# Patient Record
Sex: FEMALE | Race: WHITE | Employment: OTHER | URBAN - METROPOLITAN AREA
[De-identification: names, ages, dates, MRNs, and addresses within clinical notes are randomized per-mention and may not be internally consistent; named-entity substitution may affect disease eponyms.]

---

## 2014-08-29 LAB — AMB DEXA, EXTERNAL: NORMAL

## 2019-10-04 LAB
MAMMOGRAPHY, EXTERNAL: NEGATIVE
PAP SMEAR, EXTERNAL: NEGATIVE

## 2020-07-15 VITALS
OXYGEN SATURATION: 99 % | HEIGHT: 63 IN | WEIGHT: 123 LBS | TEMPERATURE: 97.6 F | RESPIRATION RATE: 12 BRPM | DIASTOLIC BLOOD PRESSURE: 82 MMHG | SYSTOLIC BLOOD PRESSURE: 150 MMHG | BODY MASS INDEX: 21.79 KG/M2 | HEART RATE: 80 BPM

## 2020-07-15 PROBLEM — E78.00 HYPERCHOLESTEROLEMIA: Status: ACTIVE | Noted: 2020-07-15

## 2020-07-15 PROBLEM — J30.9 ALLERGIC RHINITIS: Status: ACTIVE | Noted: 2020-07-15

## 2020-07-15 PROBLEM — K21.9 GASTROESOPHAGEAL REFLUX DISEASE: Status: ACTIVE | Noted: 2020-07-15

## 2020-07-15 PROBLEM — E03.9 ACQUIRED HYPOTHYROIDISM: Status: ACTIVE | Noted: 2020-07-15

## 2020-07-15 PROBLEM — G47.00 INSOMNIA: Status: ACTIVE | Noted: 2020-07-15

## 2020-07-15 PROBLEM — F41.9 CHRONIC ANXIETY: Status: ACTIVE | Noted: 2020-07-15

## 2020-07-15 RX ORDER — ASPIRIN 81 MG/1
TABLET ORAL DAILY
COMMUNITY

## 2020-07-15 RX ORDER — ASCORBIC ACID 250 MG
TABLET ORAL
COMMUNITY

## 2020-07-15 RX ORDER — ESZOPICLONE 2 MG/1
TABLET, FILM COATED ORAL
COMMUNITY
End: 2021-03-22

## 2020-07-15 RX ORDER — NITROFURANTOIN MACROCRYSTALS 50 MG/1
50 CAPSULE ORAL
COMMUNITY
End: 2021-03-22

## 2020-07-15 RX ORDER — PRAVASTATIN SODIUM 40 MG/1
40 TABLET ORAL
COMMUNITY
End: 2020-09-10

## 2020-07-15 RX ORDER — LEVOTHYROXINE SODIUM 50 UG/1
TABLET ORAL
COMMUNITY
End: 2020-09-10

## 2020-07-15 RX ORDER — BUPROPION HYDROCHLORIDE 150 MG/1
150 TABLET, EXTENDED RELEASE ORAL 2 TIMES DAILY
COMMUNITY
End: 2020-11-11

## 2020-07-15 RX ORDER — TRAZODONE HYDROCHLORIDE 100 MG/1
100 TABLET ORAL
COMMUNITY
End: 2021-03-22

## 2020-07-15 RX ORDER — DILTIAZEM HYDROCHLORIDE 120 MG/1
120 CAPSULE, EXTENDED RELEASE ORAL DAILY
COMMUNITY
End: 2021-03-29 | Stop reason: SDUPTHER

## 2020-07-15 RX ORDER — ALPRAZOLAM 0.5 MG/1
TABLET ORAL
COMMUNITY
End: 2020-08-15

## 2020-07-15 RX ORDER — CYCLOBENZAPRINE HCL 10 MG
TABLET ORAL
COMMUNITY
End: 2022-04-14

## 2020-08-12 DIAGNOSIS — F41.9 CHRONIC ANXIETY: Primary | ICD-10-CM

## 2020-08-13 VITALS
DIASTOLIC BLOOD PRESSURE: 82 MMHG | SYSTOLIC BLOOD PRESSURE: 150 MMHG | BODY MASS INDEX: 21.79 KG/M2 | HEIGHT: 63 IN | WEIGHT: 123 LBS

## 2020-08-14 ENCOUNTER — TELEPHONE (OUTPATIENT)
Dept: FAMILY MEDICINE CLINIC | Age: 65
End: 2020-08-14

## 2020-08-14 DIAGNOSIS — E03.9 ACQUIRED HYPOTHYROIDISM: Primary | ICD-10-CM

## 2020-08-14 DIAGNOSIS — E55.9 VITAMIN D DEFICIENCY: ICD-10-CM

## 2020-08-14 DIAGNOSIS — E78.00 HYPERCHOLESTEROLEMIA: ICD-10-CM

## 2020-08-14 NOTE — TELEPHONE ENCOUNTER
Patient rescheduled her appt to 9/9/2020 but its a afternoon so she would like to come the week before to get her labs drawn if possible. Also she is out of her Xanax and needs a refill.

## 2020-08-15 RX ORDER — ALPRAZOLAM 0.5 MG/1
TABLET ORAL
Qty: 60 TAB | Refills: 0 | Status: SHIPPED | OUTPATIENT
Start: 2020-08-15 | End: 2020-09-16

## 2020-08-15 NOTE — TELEPHONE ENCOUNTER
I will refill her medication and put in her lab orders. Last refilled [phone] and she has an upcoming appointment

## 2020-08-17 ENCOUNTER — TELEPHONE (OUTPATIENT)
Dept: FAMILY MEDICINE CLINIC | Age: 65
End: 2020-08-17

## 2020-08-17 NOTE — TELEPHONE ENCOUNTER
Left message for patient to contact pharmacy to check on refill. Looks like it was filled 2 days ago.

## 2020-09-10 RX ORDER — PRAVASTATIN SODIUM 40 MG/1
TABLET ORAL
Qty: 90 TAB | Refills: 1 | Status: SHIPPED | OUTPATIENT
Start: 2020-09-10 | End: 2021-03-01

## 2020-09-10 RX ORDER — LEVOTHYROXINE SODIUM 50 UG/1
TABLET ORAL
Qty: 90 TAB | Refills: 1 | Status: SHIPPED | OUTPATIENT
Start: 2020-09-10 | End: 2021-03-01

## 2020-09-10 NOTE — TELEPHONE ENCOUNTER
Requested meds not listed in mediation profile in Epic but they are listed in [place].   Refilling as requested

## 2020-09-15 ENCOUNTER — TELEPHONE (OUTPATIENT)
Dept: FAMILY MEDICINE CLINIC | Age: 65
End: 2020-09-15

## 2020-09-15 NOTE — TELEPHONE ENCOUNTER
It looks like you have the request from the pharmacy and the patient has an appt scheduled with you on 9/21

## 2020-09-21 ENCOUNTER — OFFICE VISIT (OUTPATIENT)
Dept: FAMILY MEDICINE CLINIC | Age: 65
End: 2020-09-21
Payer: COMMERCIAL

## 2020-09-21 VITALS
HEART RATE: 78 BPM | BODY MASS INDEX: 22.45 KG/M2 | DIASTOLIC BLOOD PRESSURE: 72 MMHG | WEIGHT: 122 LBS | OXYGEN SATURATION: 99 % | SYSTOLIC BLOOD PRESSURE: 120 MMHG | HEIGHT: 62 IN | TEMPERATURE: 97.5 F

## 2020-09-21 DIAGNOSIS — Z00.00 ENCOUNTER FOR WELLNESS EXAMINATION IN ADULT: ICD-10-CM

## 2020-09-21 DIAGNOSIS — E78.00 HYPERCHOLESTEROLEMIA: Primary | ICD-10-CM

## 2020-09-21 DIAGNOSIS — F41.9 CHRONIC ANXIETY: ICD-10-CM

## 2020-09-21 PROCEDURE — 99396 PREV VISIT EST AGE 40-64: CPT | Performed by: NURSE PRACTITIONER

## 2020-09-21 NOTE — PROGRESS NOTES
Subjective  Chief Complaint   Patient presents with    Annual Wellness Visit     HPI:  Mary Rose is a 60 y.o. female. She presents for her annual physical and fasting labs. Patient states that her biggest issue is her anxiety which exacerbated with the Tenriism of Covid 1p. 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 She is anxious to get off of her anxiety meds and was without it over a weekend and she states that she could hardly get out of bed. We will continue to find a time to wean and discontinue this medication. There are no inconstencies in her  although she is not in need of a refill at this time. Her last dexa scan was in 2010 and she declines updating it secondary to Covid 19. She also us declining her colonoscopy. The record states that she had it in 2016 but she states that it is not true. She is inclined to do cologuard and I told her to call her insurance company and see if they will cover it. She also declines the pneumonia vaccines and the shingles vaccine but she does get the flu vaccine when it is offered in the fall. She will need to make an appointment as she usually has her eyes checked in June of every year but has not this year but will call for an appointment. She is doing Yoga and is also working on anger management which she feels is because of her anxiety. She reports that she has a neuroma in her right foot and has a history of surgical removal of a neuroma in her left foot. We discussed wearing well fitting shoes and inserts if necessary.       Past Medical History:   Diagnosis Date    Acquired hypothyroidism 7/15/2020    Allergic rhinitis 7/15/2020    Chronic anxiety 7/15/2020    Gastroesophageal reflux disease 7/15/2020    Hypercholesterolemia 7/15/2020    Insomnia 7/15/2020     Family History   Problem Relation Age of Onset    Heart Disease Mother     Other Mother         History of TIA    Ulcerative Colitis Mother     Heart Disease Father     Ulcerative Colitis Sister     Diabetes Brother      Social History     Socioeconomic History    Marital status:      Spouse name: Not on file    Number of children: Not on file    Years of education: Not on file    Highest education level: Not on file   Occupational History    Not on file   Social Needs    Financial resource strain: Not on file    Food insecurity     Worry: Not on file     Inability: Not on file   [employer] needs     Medical: Not on file     Non-medical: Not on file   Tobacco Use    Smoking status: Former Smoker     Last attempt to quit:      Years since quittin.7    Smokeless tobacco: Never Used   Substance and Sexual Activity    Alcohol use: Yes     Frequency: 2-3 times a week     Drinks per session: 1 or 2    Drug use: Never    Sexual activity: Yes   Lifestyle    Physical activity     Days per week: Not on file     Minutes per session: Not on file    Stress: Not on file   Relationships    Social connections     Talks on phone: Not on file     Gets together: Not on file     Attends [employer] service: Not on file     Active member of club or organization: Not on file     Attends meetings of clubs or organizations: Not on file     Relationship status: Not on file    Intimate partner violence     Fear of current or ex partner: Not on file     Emotionally abused: Not on file     Physically abused: Not on file     Forced sexual activity: Not on file   Other Topics Concern    Not on file   Social History Narrative    Not on file     Current Outpatient Medications on File Prior to Visit   Medication Sig Dispense Refill    BIOTIN PO Take 5,000 mcg by mouth. 1 capsule PO daily      cranberry fruit extract (CRANBERRY PO) Take  by mouth.  1 capsule PO daily      ALPRAZolam (XANAX) 0.5 mg tablet TAKE 2 TABLETS BY MOUTH EVERY DAY AS NEEDED 60 Tab 0    pravastatin (PRAVACHOL) 40 mg tablet TAKE 1 TABLET BY MOUTH EVERY NIGHT AT BEDTIME 90 Tab 1    levothyroxine (SYNTHROID) 50 mcg tablet TAKE 1 TABLET BY MOUTH EVERY DAY ON EMPTY STOMACH 90 Tab 1    aspirin delayed-release (Adult Low Dose Aspirin) 81 mg tablet Take  by mouth daily.  buPROPion SR (WELLBUTRIN SR) 150 mg SR tablet Take 150 mg by mouth two (2) times a day.  dilTIAZem ER (DILACOR XR) 120 mg capsule Take 120 mg by mouth daily. 1/2 tablet PO twice daily      nitrofurantoin (MACRODANTIN) 50 mg capsule Take 50 mg by mouth BID Mon Wed & Fri.      conjugated estrogens (Premarin) 1.25 mg tablet Take 1.25 mg by mouth daily.  ascorbic acid, vitamin C, (Vitamin C) 250 mg tablet Take  by mouth.  hydrOXYzine HCL (ATARAX) 50 mg tablet TK 1 T PO QID PRN      sertraline (ZOLOFT) 25 mg tablet 25 mg. Take one by mouth in the morning      OTHER Cannabidiol (CBD) oral edible Take 2 edibles every day by oral route      cyclobenzaprine (FLEXERIL) 10 mg tablet Take  by mouth three (3) times daily as needed for Muscle Spasm(s). Take 1 tablet by mouth twice a day      eszopiclone (LUNESTA) 2 mg tablet Take  by mouth nightly.  traZODone (DESYREL) 100 mg tablet Take 100 mg by mouth nightly. No current facility-administered medications on file prior to visit. Allergies   Allergen Reactions    Sertraline Hives    Augmentin [Amoxicillin-Pot Clavulanate] Diarrhea    Biaxin [Clarithromycin] Other (comments)     Stomach burns    Ceftin [Cefuroxime Axetil] Other (comments)     Stomach burns    Codeine Itching    Erythromycin Base Nausea and Vomiting    Prednisone Other (comments)     confusion    Trazodone Itching     ROS   ROS as per HPI and PMH      Objective  Physical Exam  Vitals signs reviewed. Neck:      Musculoskeletal: Normal range of motion. Cardiovascular:      Rate and Rhythm: Normal rate and regular rhythm. Heart sounds: Normal heart sounds.    Pulmonary:      Effort: Pulmonary effort is normal.      Breath sounds: Normal breath sounds. Abdominal:      General: Bowel sounds are normal.      Palpations: Abdomen is soft. Musculoskeletal: Normal range of motion. Skin:     General: Skin is warm and dry. Neurological:      Mental Status: She is alert and oriented to person, place, and time. Psychiatric:         Mood and Affect: Mood normal.         Behavior: Behavior normal.         Thought Content: Thought content normal.         Judgment: Judgment normal.          Assessment & Plan      ICD-10-CM ICD-9-CM    1. Hypercholesterolemia  E78.00 272.0 LIPID PANEL   2. Encounter for wellness examination in adult  Z00.00 V70.0 CBC WITH AUTOMATED DIFF      METABOLIC PANEL, COMPREHENSIVE   3. Chronic anxiety  F41.9 300.00      Diagnoses and all orders for this visit:    1. Hypercholesterolemia  -     LIPID PANEL    2. Encounter for wellness examination in adult  -     CBC WITH AUTOMATED DIFF  -     METABOLIC PANEL, COMPREHENSIVE    3.  Chronic anxiety        Dory Farfan NP

## 2020-09-22 LAB
ALBUMIN SERPL-MCNC: 4.5 G/DL (ref 3.8–4.8)
ALBUMIN/GLOB SERPL: 2 {RATIO} (ref 1.2–2.2)
ALP SERPL-CCNC: 70 IU/L (ref 39–117)
ALT SERPL-CCNC: 14 IU/L (ref 0–32)
AST SERPL-CCNC: 19 IU/L (ref 0–40)
BASOPHILS # BLD AUTO: 0.1 X10E3/UL (ref 0–0.2)
BASOPHILS NFR BLD AUTO: 1 %
BILIRUB SERPL-MCNC: 0.3 MG/DL (ref 0–1.2)
BUN SERPL-MCNC: 10 MG/DL (ref 8–27)
BUN/CREAT SERPL: 17 (ref 12–28)
CALCIUM SERPL-MCNC: 9.2 MG/DL (ref 8.7–10.3)
CHLORIDE SERPL-SCNC: 101 MMOL/L (ref 96–106)
CHOLEST SERPL-MCNC: 245 MG/DL (ref 100–199)
CO2 SERPL-SCNC: 23 MMOL/L (ref 20–29)
CREAT SERPL-MCNC: 0.59 MG/DL (ref 0.57–1)
EOSINOPHIL # BLD AUTO: 0.1 X10E3/UL (ref 0–0.4)
EOSINOPHIL NFR BLD AUTO: 2 %
ERYTHROCYTE [DISTWIDTH] IN BLOOD BY AUTOMATED COUNT: 11.9 % (ref 11.7–15.4)
GLOBULIN SER CALC-MCNC: 2.2 G/DL (ref 1.5–4.5)
GLUCOSE SERPL-MCNC: 83 MG/DL (ref 65–99)
HCT VFR BLD AUTO: 41.2 % (ref 34–46.6)
HDLC SERPL-MCNC: 85 MG/DL
HGB BLD-MCNC: 13.7 G/DL (ref 11.1–15.9)
IMM GRANULOCYTES # BLD AUTO: 0 X10E3/UL (ref 0–0.1)
IMM GRANULOCYTES NFR BLD AUTO: 0 %
LDLC SERPL CALC-MCNC: 139 MG/DL (ref 0–99)
LYMPHOCYTES # BLD AUTO: 1.6 X10E3/UL (ref 0.7–3.1)
LYMPHOCYTES NFR BLD AUTO: 27 %
MCH RBC QN AUTO: 31.3 PG (ref 26.6–33)
MCHC RBC AUTO-ENTMCNC: 33.3 G/DL (ref 31.5–35.7)
MCV RBC AUTO: 94 FL (ref 79–97)
MONOCYTES # BLD AUTO: 0.4 X10E3/UL (ref 0.1–0.9)
MONOCYTES NFR BLD AUTO: 7 %
NEUTROPHILS # BLD AUTO: 3.8 X10E3/UL (ref 1.4–7)
NEUTROPHILS NFR BLD AUTO: 63 %
PLATELET # BLD AUTO: 254 X10E3/UL (ref 150–450)
POTASSIUM SERPL-SCNC: 4.3 MMOL/L (ref 3.5–5.2)
PROT SERPL-MCNC: 6.7 G/DL (ref 6–8.5)
RBC # BLD AUTO: 4.38 X10E6/UL (ref 3.77–5.28)
SODIUM SERPL-SCNC: 140 MMOL/L (ref 134–144)
TRIGL SERPL-MCNC: 122 MG/DL (ref 0–149)
VLDLC SERPL CALC-MCNC: 21 MG/DL (ref 5–40)
WBC # BLD AUTO: 6 X10E3/UL (ref 3.4–10.8)

## 2020-09-30 ENCOUNTER — TELEPHONE (OUTPATIENT)
Dept: FAMILY MEDICINE CLINIC | Age: 65
End: 2020-09-30

## 2020-09-30 DIAGNOSIS — Z12.11 COLON CANCER SCREENING: Primary | ICD-10-CM

## 2020-10-02 NOTE — PROGRESS NOTES
Your total cholesterol and bad cholesterol are elevated. Your total cholesterol was 235 at your last set of labs and it is now 245 and your bad cholesterol has also slightly increased from 136 to 139. I do see that you are on 40 mgs of atorvastatin and before I make any decisions I want to make sure that you continue to take this medication and preferably in the PM.  If so I will recheck at your next office visit and if it continues to increase I will increase the dosage but I also recommend lifestyle changes such as :Decrease your intake of red meat and increase your intake of fatty fish such as salmon, mackerel and sardines. Increase fiber in your diet.   Decrease your intake of full fat dairy products such as milk and yogurt and try to incorporate at least 30 minutes of exercise most days of the week and losing weight helps everything

## 2020-10-07 NOTE — PROGRESS NOTES
Patient advised. * Patient would like for you to order a cologard for her, as her insurance will pay for this for her.

## 2020-10-13 DIAGNOSIS — F41.9 CHRONIC ANXIETY: ICD-10-CM

## 2020-10-14 RX ORDER — ALPRAZOLAM 0.5 MG/1
TABLET ORAL
Qty: 60 TAB | Refills: 0 | Status: SHIPPED | OUTPATIENT
Start: 2020-10-14 | End: 2020-11-04

## 2020-10-17 VITALS
WEIGHT: 120 LBS | BODY MASS INDEX: 22.08 KG/M2 | SYSTOLIC BLOOD PRESSURE: 110 MMHG | DIASTOLIC BLOOD PRESSURE: 70 MMHG | HEIGHT: 62 IN

## 2020-10-20 ENCOUNTER — OFFICE VISIT (OUTPATIENT)
Dept: OBGYN CLINIC | Age: 65
End: 2020-10-20
Payer: COMMERCIAL

## 2020-10-20 VITALS
SYSTOLIC BLOOD PRESSURE: 120 MMHG | HEIGHT: 62 IN | WEIGHT: 120.13 LBS | BODY MASS INDEX: 22.11 KG/M2 | DIASTOLIC BLOOD PRESSURE: 72 MMHG

## 2020-10-20 DIAGNOSIS — Z12.31 VISIT FOR SCREENING MAMMOGRAM: Primary | ICD-10-CM

## 2020-10-20 DIAGNOSIS — Z01.419 ROUTINE GYNECOLOGICAL EXAMINATION: ICD-10-CM

## 2020-10-20 DIAGNOSIS — Z90.711 SCREENING FOR MALIGNANT NEOPLASM OF VAGINA AFTER PARTIAL HYSTERECTOMY: Primary | ICD-10-CM

## 2020-10-20 DIAGNOSIS — Z12.72 SCREENING FOR MALIGNANT NEOPLASM OF VAGINA AFTER PARTIAL HYSTERECTOMY: Primary | ICD-10-CM

## 2020-10-20 DIAGNOSIS — N95.1 MENOPAUSAL SYNDROME: ICD-10-CM

## 2020-10-20 PROCEDURE — 77063 BREAST TOMOSYNTHESIS BI: CPT | Performed by: OBSTETRICS & GYNECOLOGY

## 2020-10-20 PROCEDURE — 99396 PREV VISIT EST AGE 40-64: CPT | Performed by: OBSTETRICS & GYNECOLOGY

## 2020-10-20 PROCEDURE — 77067 SCR MAMMO BI INCL CAD: CPT | Performed by: OBSTETRICS & GYNECOLOGY

## 2020-10-20 NOTE — PROGRESS NOTES
Michael Gregory is a [de-identified] , 60 y.o. female   No LMP recorded. Patient has had a hysterectomy. She presents for her annual    She is having no significant problems. Menstrual status:  Her periods are: Hysterectomy. Cycles are: Hysterectomy. She does not have dysmenorrhea. Medical conditions:  Since her last annual GYN exam about one year ago, she has not the following changes in her health history: none. Past Medical History:   Diagnosis Date    Acquired hypothyroidism 7/15/2020    Allergic rhinitis 7/15/2020    Chronic anxiety 7/15/2020    Condyloma     Depression     Female stress incontinence     Gastroesophageal reflux disease 7/15/2020    Hypercholesterolemia 7/15/2020    Hypertension     Insomnia 7/15/2020    Menopause     Relaxation of pelvis     Relaxation of pelvic floor.  Small bowel obstruction [facility])      Past Surgical History:   Procedure Laterality Date    HX COLONOSCOPY  2016    HX GI      Repair of small intestine.  HX GYN      Laser Condyloma    HX HERNIA REPAIR      HX HYSTERECTOMY      HX PELVIC LAPAROSCOPY      JOAQUIN    HX RECTOCELE REPAIR      Repair of enterocele    HX SALPINGO-OOPHORECTOMY Right     HX TONSILLECTOMY      SLING OPER STRES INCONTINENCE         Prior to Admission medications    Medication Sig Start Date End Date Taking? Authorizing Provider   ALPRAZolam Susan Section) 0.5 mg tablet TAKE 2 TABLETS BY MOUTH EVERY DAY AS NEEDED 10/14/20  Yes Alie Hidalgo NP   BIOTIN PO Take 5,000 mcg by mouth. 1 capsule PO daily   Yes Provider, Historical   cranberry fruit extract (CRANBERRY PO) Take  by mouth.  1 capsule PO daily   Yes Provider, Historical   pravastatin (PRAVACHOL) 40 mg tablet TAKE 1 TABLET BY MOUTH EVERY NIGHT AT BEDTIME 9/10/20  Yes Alie Hidalgo NP   levothyroxine (SYNTHROID) 50 mcg tablet TAKE 1 TABLET BY MOUTH EVERY DAY ON EMPTY STOMACH 9/10/20  Yes Alie Hidalgo NP   hydrOXYzine HCL (ATARAX) 50 mg tablet TK 1 T PO QID PRN 5/7/20  Yes Provider, Historical   sertraline (ZOLOFT) 25 mg tablet 25 mg. Take one by mouth in the morning 5/8/20  Yes Provider, Historical   aspirin delayed-release (Adult Low Dose Aspirin) 81 mg tablet Take  by mouth daily. Yes Provider, Historical   buPROPion SR (WELLBUTRIN SR) 150 mg SR tablet Take 150 mg by mouth two (2) times a day. Yes Provider, Historical   OTHER Cannabidiol (CBD) oral edible Take 2 edibles every day by oral route   Yes Provider, Historical   cyclobenzaprine (FLEXERIL) 10 mg tablet Take  by mouth three (3) times daily as needed for Muscle Spasm(s). Take 1 tablet by mouth twice a day   Yes Provider, Historical   dilTIAZem ER (DILACOR XR) 120 mg capsule Take 120 mg by mouth daily. 1/2 tablet PO twice daily   Yes Provider, Historical   eszopiclone (LUNESTA) 2 mg tablet Take  by mouth nightly. Yes Provider, Historical   conjugated estrogens (Premarin) 1.25 mg tablet Take 1.25 mg by mouth daily. Yes Provider, Historical   traZODone (DESYREL) 100 mg tablet Take 100 mg by mouth nightly. Yes Provider, Historical   ascorbic acid, vitamin C, (Vitamin C) 250 mg tablet Take  by mouth. Yes Provider, Historical   nitrofurantoin (MACRODANTIN) 50 mg capsule Take 50 mg by mouth BID Mon Wed & Fri. Provider, Historical       Allergies   Allergen Reactions    Augmentin [Amoxicillin-Pot Clavulanate] Diarrhea    Biaxin [Clarithromycin] Other (comments)     Stomach burns    Ceftin [Cefuroxime Axetil] Other (comments)     Stomach burns    Codeine Itching    Eryc [Erythromycin] Rash    Erythromycin Base Nausea and Vomiting    Lomotil [Diphenoxylate-Atropine] Rash    Prednisone Other (comments)     confusion    Trazodone Itching    Sertraline Hives          Tobacco History:  reports that she quit smoking about 33 years ago. She has never used smokeless tobacco.  Alcohol Abuse:  reports current alcohol use. Drug Abuse:  reports no history of drug use.     Family Medical/Cancer History: Family History   Problem Relation Age of Onset    Heart Disease Mother     Other Mother         History of TIA    Ulcerative Colitis Mother     Heart Disease Father     Ulcerative Colitis Sister     Diabetes Brother     Thyroid Disease Other         Family hx of thyroid disorder, unspecified relation.  Hypertension Other         Family hx of HTN, unspecified relation. Review of Systems   Constitutional: Negative for chills, fever, malaise/fatigue and weight loss. HENT: Negative for congestion, ear pain, sinus pain and tinnitus. Eyes: Negative for blurred vision and double vision. Respiratory: Negative for cough, shortness of breath and wheezing. Cardiovascular: Negative for chest pain and palpitations. Gastrointestinal: Negative for abdominal pain, blood in stool, constipation, diarrhea, heartburn, nausea and vomiting. Genitourinary: Negative for dysuria, flank pain, frequency, hematuria and urgency. Musculoskeletal: Negative for joint pain and myalgias. Skin: Negative for itching and rash. Neurological: Negative for dizziness, weakness and headaches. Psychiatric/Behavioral: Negative for depression, memory loss and suicidal ideas. The patient is not nervous/anxious and does not have insomnia. Physical Exam  Constitutional:       Appearance: Normal appearance. HENT:      Head: Normocephalic and atraumatic. Cardiovascular:      Rate and Rhythm: Normal rate. Heart sounds: Normal heart sounds. Pulmonary:      Effort: Pulmonary effort is normal.      Breath sounds: Normal breath sounds. Chest:      Breasts:         Right: Normal.         Left: Normal.   Abdominal:      General: Abdomen is flat. Palpations: Abdomen is soft. Genitourinary:     General: Normal vulva. Vagina: Normal.      Uterus: Absent.        Adnexa: Right adnexa normal and left adnexa normal.      Rectum: Normal.      Comments: PAP Obtained  Neurological:      Mental Status: She is alert. Psychiatric:         Mood and Affect: Mood normal.         Behavior: Behavior normal.         Thought Content: Thought content normal.          Visit Vitals  /72 (BP 1 Location: Right arm, BP Patient Position: Sitting)   Ht 5' 2\" (1.575 m)   Wt 120 lb 2 oz (54.5 kg)   BMI 21.97 kg/m²         Assessment:  Diagnoses and all orders for this visit:    1. Screening for malignant neoplasm of vagina after partial hysterectomy  -     PAP, LB, RFX HPV VAKBT([id number])    2. Routine gynecological examination  -     PAP, LB, RFX HPV HRVAS([id number])    3. Menopausal syndrome        Plan:Questions addressed  Counseled re: diet, exercise, healthy lifestyle  Return for Annual  Rec annual mammogram        Follow-up and Dispositions    · Return for 1 yr annual, 1 yr mammo.

## 2020-10-21 PROBLEM — Z00.00 ENCOUNTER FOR WELLNESS EXAMINATION IN ADULT: Status: RESOLVED | Noted: 2020-09-21 | Resolved: 2020-10-21

## 2020-10-27 ENCOUNTER — PATIENT MESSAGE (OUTPATIENT)
Dept: FAMILY MEDICINE CLINIC | Age: 65
End: 2020-10-27

## 2020-10-27 NOTE — TELEPHONE ENCOUNTER
Pt brought in letter to have some things fixed in her chart. Pt also wanted recommendation in regards to getting Tetanus Vaccine. Spoke to HAYDEE Ernst and she said that pt can get her Tetanus shot at the pharmacy when she gets her flu shot. Medications were fixed as well. Left message advising pt.

## 2020-10-28 ENCOUNTER — TELEPHONE (OUTPATIENT)
Dept: FAMILY MEDICINE CLINIC | Age: 65
End: 2020-10-28

## 2020-10-28 LAB
CYTOLOGIST CVX/VAG CYTO: NORMAL
CYTOLOGY CVX/VAG DOC CYTO: NORMAL
DX ICD CODE: NORMAL
LABCORP, 190119: NORMAL
Lab: NORMAL
OTHER STN SPEC: NORMAL
STAT OF ADQ CVX/VAG CYTO-IMP: NORMAL

## 2020-10-28 NOTE — TELEPHONE ENCOUNTER
----- Message from Jose Calhoun NP sent at 10/28/2020  5:04 PM EDT -----  Negative cologuard.   Repeat in 3 years

## 2020-11-04 DIAGNOSIS — F41.9 CHRONIC ANXIETY: ICD-10-CM

## 2020-11-04 RX ORDER — ALPRAZOLAM 0.5 MG/1
TABLET ORAL
Qty: 60 TAB | Refills: 0 | Status: SHIPPED | OUTPATIENT
Start: 2020-11-04 | End: 2020-12-16 | Stop reason: SDUPTHER

## 2020-11-04 NOTE — TELEPHONE ENCOUNTER
Patient seen within the last 6 months and medication last [id number]/15/2020. No inconsistencies with PCP. Note to pharmacy to refill 10/12/2020

## 2020-11-11 RX ORDER — BUPROPION HYDROCHLORIDE 150 MG/1
TABLET, EXTENDED RELEASE ORAL
Qty: 180 TAB | Refills: 1 | Status: SHIPPED | OUTPATIENT
Start: 2020-11-11 | End: 2021-06-18 | Stop reason: SDUPTHER

## 2020-11-13 ENCOUNTER — TELEPHONE (OUTPATIENT)
Dept: FAMILY MEDICINE CLINIC | Age: 65
End: 2020-11-13

## 2020-11-13 NOTE — TELEPHONE ENCOUNTER
Prescription for Alprazolam 0.5mg 2 tablets po daily # 60 was sent in with a to be filled date of 12/12/2020. Per TSS this was a mistake and should of been 11/12/2020. I called [facility] # [phone] and spoke with pharmacist and okayed prescription fill date as of 11/12/2020 and he was going to change it.

## 2020-12-15 ENCOUNTER — TELEPHONE (OUTPATIENT)
Dept: FAMILY MEDICINE CLINIC | Age: 65
End: 2020-12-15

## 2020-12-15 DIAGNOSIS — F41.9 CHRONIC ANXIETY: ICD-10-CM

## 2020-12-16 RX ORDER — ALPRAZOLAM 0.5 MG/1
TABLET ORAL
Qty: 90 TAB | Refills: 1 | Status: SHIPPED | OUTPATIENT
Start: 2020-12-16 | End: 2021-03-01

## 2020-12-16 NOTE — TELEPHONE ENCOUNTER
The drug store states they only filled the xanax last time for #30 because that's what Dennis Villanueva wrote it for. She got only 31 pills on 11/30/2020 and she takes 2 daily so she was short for the month by 30 The drug store told her that Dennis Villanueva stated  that's all her insurance will approve. She's out and needs the refill sent in for 90 pills which will last for 30 days please.

## 2021-02-28 DIAGNOSIS — F41.9 CHRONIC ANXIETY: ICD-10-CM

## 2021-03-01 RX ORDER — PRAVASTATIN SODIUM 40 MG/1
TABLET ORAL
Qty: 90 TAB | Refills: 1 | Status: SHIPPED | OUTPATIENT
Start: 2021-03-01

## 2021-03-01 RX ORDER — LEVOTHYROXINE SODIUM 50 UG/1
TABLET ORAL
Qty: 90 TAB | Refills: 1 | Status: SHIPPED | OUTPATIENT
Start: 2021-03-01

## 2021-03-01 RX ORDER — ALPRAZOLAM 0.5 MG/1
TABLET ORAL
Qty: 90 TAB | Refills: 0 | Status: SHIPPED | OUTPATIENT
Start: 2021-03-01 | End: 2021-04-22 | Stop reason: SDUPTHER

## 2021-03-22 ENCOUNTER — OFFICE VISIT (OUTPATIENT)
Dept: FAMILY MEDICINE CLINIC | Age: 66
End: 2021-03-22
Payer: COMMERCIAL

## 2021-03-22 VITALS
OXYGEN SATURATION: 99 % | BODY MASS INDEX: 22.05 KG/M2 | RESPIRATION RATE: 16 BRPM | HEART RATE: 85 BPM | TEMPERATURE: 97.3 F | HEIGHT: 62 IN | WEIGHT: 119.8 LBS | SYSTOLIC BLOOD PRESSURE: 130 MMHG | DIASTOLIC BLOOD PRESSURE: 68 MMHG

## 2021-03-22 DIAGNOSIS — E78.00 HYPERCHOLESTEROLEMIA: ICD-10-CM

## 2021-03-22 DIAGNOSIS — G47.00 INSOMNIA, UNSPECIFIED TYPE: ICD-10-CM

## 2021-03-22 DIAGNOSIS — E03.9 ACQUIRED HYPOTHYROIDISM: Primary | ICD-10-CM

## 2021-03-22 DIAGNOSIS — F41.9 CHRONIC ANXIETY: ICD-10-CM

## 2021-03-22 PROCEDURE — 99214 OFFICE O/P EST MOD 30 MIN: CPT | Performed by: NURSE PRACTITIONER

## 2021-03-22 NOTE — PROGRESS NOTES
Subjective  Chief Complaint   Patient presents with    Follow Up Chronic Condition     discuss current meds     HPI:  Yanci Estrada is a 73 y.o. female. 72 yo female presents for f/u of chronic conditions and for use of controlled substance. Patient is on Xanax and is now seeing a counselor q 1-2 times a months (Dr. Darcia Bloch) who states that she should not be discontinued from her xanax and will write a letter stipulating so. I have given the patient my card and requested the letter. Patient was asking about getting her Covid shot and updating her tdap and I have advised her to postpone the tdap until she has completed the Covid series. Health screenings as documented in the care gaps section of the EMR. 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 Patient is otherwise without complaints    Past Medical History:   Diagnosis Date    Acquired hypothyroidism 7/15/2020    Allergic rhinitis 7/15/2020    Chronic anxiety 7/15/2020    Condyloma     Depression     Female stress incontinence     Gastroesophageal reflux disease 7/15/2020    Hypercholesterolemia 7/15/2020    Hypertension     Insomnia 7/15/2020    Menopause     Relaxation of pelvis     Relaxation of pelvic floor.  Small bowel obstruction ([address]      Family History   Problem Relation Age of Onset    Heart Disease Mother     Other Mother         History of TIA    Ulcerative Colitis Mother     Heart Disease Father     Ulcerative Colitis Sister     Diabetes Brother     Thyroid Disease Other         Family hx of thyroid disorder, unspecified relation.  Hypertension Other         Family hx of HTN, unspecified relation.      Social History     Socioeconomic History    Marital status:      Spouse name: Not on file    Number of children: Not on file    Years of education: Not on file    Highest education level: Not on file   Occupational History    Not on file   Social Needs    Financial resource strain: Not on file    Food insecurity     Worry: Not on file     Inability: Not on file    Transportation needs     Medical: Not on file     Non-medical: Not on file   Tobacco Use    Smoking status: Former Smoker     Quit date:      Years since quittin.2    Smokeless tobacco: Never Used   Substance and Sexual Activity    Alcohol use: Yes     Frequency: 2-3 times a week     Drinks per session: 1 or 2     Comment: Occasional use.  Drug use: Never    Sexual activity: Not Currently     Birth control/protection: Surgical     Comment: HYST/RSO   Lifestyle    Physical activity     Days per week: Not on file     Minutes per session: Not on file    Stress: Not on file   Relationships    Social connections     Talks on phone: Not on file     Gets together: Not on file     Attends Spiritism service: Not on file     Active member of club or organization: Not on file     Attends meetings of clubs or organizations: Not on file     Relationship status: Not on file    Intimate partner violence     Fear of current or ex partner: Not on file     Emotionally abused: Not on file     Physically abused: Not on file     Forced sexual activity: Not on file   Other Topics Concern    Not on file   Social History Narrative    Not on file     Current Outpatient Medications on File Prior to Visit   Medication Sig Dispense Refill    pravastatin (PRAVACHOL) 40 mg tablet TAKE 1 TABLET BY MOUTH EVERY NIGHT AT BEDTIME 90 Tab 1    ALPRAZolam (XANAX) 0.5 mg tablet TAKE 1 TABLET BY MOUTH THREE TIMES DAILY 90 Tab 0    levothyroxine (SYNTHROID) 50 mcg tablet TAKE 1 TABLET BY MOUTH EVERY DAY ON EMPTY STOMACH 90 Tab 1    buPROPion SR (WELLBUTRIN SR) 150 mg SR tablet TAKE 1 TABLET BY MOUTH TWICE DAILY 180 Tab 1    BIOTIN PO Take 5,000 mcg by mouth. 1 capsule PO daily      cranberry fruit extract (CRANBERRY PO) Take  by mouth.  1 capsule PO daily      aspirin delayed-release (Adult Low Dose Aspirin) 81 mg tablet Take  by mouth daily.  dilTIAZem ER (DILACOR XR) 120 mg capsule Take 120 mg by mouth daily. 1/2 tablet PO twice daily      conjugated estrogens (Premarin) 1.25 mg tablet Take 1.25 mg by mouth daily.  ascorbic acid, vitamin C, (Vitamin C) 250 mg tablet Take  by mouth.  hydrOXYzine HCL (ATARAX) 50 mg tablet TK 1 T PO QID PRN      sertraline (ZOLOFT) 25 mg tablet 25 mg. Take one by mouth in the morning      OTHER Cannabidiol (CBD) oral edible Take 2 edibles every day by oral route      cyclobenzaprine (FLEXERIL) 10 mg tablet Take  by mouth three (3) times daily as needed for Muscle Spasm(s). Take 1 tablet by mouth twice a day      [DISCONTINUED] eszopiclone (LUNESTA) 2 mg tablet Take  by mouth nightly.  [DISCONTINUED] nitrofurantoin (MACRODANTIN) 50 mg capsule Take 50 mg by mouth BID Mon Wed & Fri.      [DISCONTINUED] traZODone (DESYREL) 100 mg tablet Take 100 mg by mouth nightly. No current facility-administered medications on file prior to visit. Allergies   Allergen Reactions    Augmentin [Amoxicillin-Pot Clavulanate] Diarrhea    Biaxin [Clarithromycin] Other (comments)     Stomach burns    Ceftin [Cefuroxime Axetil] Other (comments)     Stomach burns    Codeine Itching    Eryc [Erythromycin] Rash    Erythromycin Base Nausea and Vomiting    Lomotil [Diphenoxylate-Atropine] Rash    Prednisone Other (comments)     confusion    Trazodone Itching    Sertraline Hives     ROS   ROS per HPI and PMH      Objective  Physical Exam  Vitals signs reviewed. HENT:      Head: Normocephalic. Cardiovascular:      Rate and Rhythm: Normal rate and regular rhythm. Heart sounds: Normal heart sounds. Pulmonary:      Effort: Pulmonary effort is normal.      Breath sounds: Normal breath sounds. Abdominal:      General: Bowel sounds are normal.      Palpations: Abdomen is soft. Skin:     General: Skin is warm and dry. Neurological:      Mental Status: She is alert and oriented to person, place, and time. Psychiatric:         Mood and Affect: Mood normal.         Behavior: Behavior normal.         Thought Content: Thought content normal.         Judgment: Judgment normal.          Assessment & Plan      ICD-10-CM ICD-9-CM    1. Acquired hypothyroidism  E03.9 244.9 CBC WITH AUTOMATED DIFF      METABOLIC PANEL, COMPREHENSIVE      TSH 3RD GENERATION      T4, FREE   2. Hypercholesterolemia  E78.00 272.0 LIPID PANEL   3. Chronic anxiety  F41.9 300.00    4. Insomnia, unspecified type  G47.00 780.52      Diagnoses and all orders for this visit:    1. Acquired hypothyroidism  -     CBC WITH AUTOMATED DIFF  -     METABOLIC PANEL, COMPREHENSIVE  -     TSH 3RD GENERATION  -     T4, FREE  Obtaining updated labs for trending and will make treatment decisions when I get the results  2. Hypercholesterolemia  -     LIPID PANEL  Obtaining updated Lipid panel for trending and will make treatment decisions when I get the results    3. Chronic anxiety  Patient is seeing a counselor Dr. Nay Hatch who is going to write a letter stating not to discontinue patient from xanax. Will continue with use of xanax    4. Insomnia, unspecified type  Patient has tried lunesta and trazadone and has found that xanax is sufficient.   Will continue with xanax        Cali Tuttle NP

## 2021-03-22 NOTE — PROGRESS NOTES
Chief Complaint   Patient presents with    Follow Up Chronic Condition     discuss current meds     1. Have you been to the ER, urgent care clinic since your last visit? Hospitalized since your last visit? No    2. Have you seen or consulted any other health care providers outside of the [address] [place] [address] since your last visit? Include any pap smears or colon screening.  No     Wants medications that she is not taking removed: trazdone, zoloft, flexirl, lunesta, atrax, cannibidolol

## 2021-03-23 LAB
ALBUMIN SERPL-MCNC: 4.4 G/DL (ref 3.8–4.8)
ALBUMIN/GLOB SERPL: 2.1 {RATIO} (ref 1.2–2.2)
ALP SERPL-CCNC: 74 IU/L (ref 39–117)
ALT SERPL-CCNC: 10 IU/L (ref 0–32)
AST SERPL-CCNC: 18 IU/L (ref 0–40)
BASOPHILS # BLD AUTO: 0.1 X10E3/UL (ref 0–0.2)
BASOPHILS NFR BLD AUTO: 1 %
BILIRUB SERPL-MCNC: 0.2 MG/DL (ref 0–1.2)
BUN SERPL-MCNC: 11 MG/DL (ref 8–27)
BUN/CREAT SERPL: 15 (ref 12–28)
CALCIUM SERPL-MCNC: 9 MG/DL (ref 8.7–10.3)
CHLORIDE SERPL-SCNC: 102 MMOL/L (ref 96–106)
CHOLEST SERPL-MCNC: 211 MG/DL (ref 100–199)
CO2 SERPL-SCNC: 25 MMOL/L (ref 20–29)
CREAT SERPL-MCNC: 0.71 MG/DL (ref 0.57–1)
EOSINOPHIL # BLD AUTO: 0.1 X10E3/UL (ref 0–0.4)
EOSINOPHIL NFR BLD AUTO: 2 %
ERYTHROCYTE [DISTWIDTH] IN BLOOD BY AUTOMATED COUNT: 11.8 % (ref 11.7–15.4)
GLOBULIN SER CALC-MCNC: 2.1 G/DL (ref 1.5–4.5)
GLUCOSE SERPL-MCNC: 91 MG/DL (ref 65–99)
HCT VFR BLD AUTO: 37.5 % (ref 34–46.6)
HDLC SERPL-MCNC: 80 MG/DL
HGB BLD-MCNC: 12.9 G/DL (ref 11.1–15.9)
IMM GRANULOCYTES # BLD AUTO: 0 X10E3/UL (ref 0–0.1)
IMM GRANULOCYTES NFR BLD AUTO: 0 %
LDLC SERPL CALC-MCNC: 110 MG/DL (ref 0–99)
LYMPHOCYTES # BLD AUTO: 1.4 X10E3/UL (ref 0.7–3.1)
LYMPHOCYTES NFR BLD AUTO: 20 %
MCH RBC QN AUTO: 31.4 PG (ref 26.6–33)
MCHC RBC AUTO-ENTMCNC: 34.4 G/DL (ref 31.5–35.7)
MCV RBC AUTO: 91 FL (ref 79–97)
MONOCYTES # BLD AUTO: 0.5 X10E3/UL (ref 0.1–0.9)
MONOCYTES NFR BLD AUTO: 8 %
NEUTROPHILS # BLD AUTO: 4.8 X10E3/UL (ref 1.4–7)
NEUTROPHILS NFR BLD AUTO: 69 %
PLATELET # BLD AUTO: 249 X10E3/UL (ref 150–450)
POTASSIUM SERPL-SCNC: 4.1 MMOL/L (ref 3.5–5.2)
PROT SERPL-MCNC: 6.5 G/DL (ref 6–8.5)
RBC # BLD AUTO: 4.11 X10E6/UL (ref 3.77–5.28)
SODIUM SERPL-SCNC: 140 MMOL/L (ref 134–144)
T4 FREE SERPL-MCNC: 1.13 NG/DL (ref 0.82–1.77)
TRIGL SERPL-MCNC: 122 MG/DL (ref 0–149)
TSH SERPL DL<=0.005 MIU/L-ACNC: 0.55 UIU/ML (ref 0.45–4.5)
VLDLC SERPL CALC-MCNC: 21 MG/DL (ref 5–40)
WBC # BLD AUTO: 6.9 X10E3/UL (ref 3.4–10.8)

## 2021-03-24 NOTE — PROGRESS NOTES
Your total cholesterol and bad cholesterol are slightly elevated but improved from your last set of labs so we will continue with the cholesterol med.   Your other labs are WNL and we will recheck at your next office visit

## 2021-03-24 NOTE — PROGRESS NOTES
Your total cholesterol and one of your bad cholesterols are still slightly elevated but much improved over your last set of labs so we will still continue with the pravastatin. Your other labs are all WNL.   We will recheck at your next office visit

## 2021-03-29 RX ORDER — DILTIAZEM HYDROCHLORIDE 120 MG/1
120 TABLET, FILM COATED ORAL DAILY
COMMUNITY
End: 2021-03-31

## 2021-03-29 RX ORDER — DILTIAZEM HYDROCHLORIDE 120 MG/1
120 CAPSULE, EXTENDED RELEASE ORAL DAILY
Qty: 90 CAP | Refills: 1 | Status: SHIPPED | OUTPATIENT
Start: 2021-03-29 | End: 2021-03-29

## 2021-04-22 ENCOUNTER — TELEPHONE (OUTPATIENT)
Dept: FAMILY MEDICINE CLINIC | Age: 66
End: 2021-04-22

## 2021-04-22 DIAGNOSIS — F41.9 CHRONIC ANXIETY: ICD-10-CM

## 2021-04-22 RX ORDER — ALPRAZOLAM 0.5 MG/1
TABLET ORAL
Qty: 90 TAB | Refills: 0 | Status: SHIPPED | OUTPATIENT
Start: 2021-04-22 | End: 2021-05-24

## 2021-04-22 NOTE — TELEPHONE ENCOUNTER
Patient has been seen in the last 6 months and the med was last refilled [id number]. No inconsistencies noted on

## 2021-04-22 NOTE — TELEPHONE ENCOUNTER
Refill requests for ALPRAZolam 0.5 mg tablet. Pt changed pharmacy to [id number]

## 2021-05-24 DIAGNOSIS — F41.9 CHRONIC ANXIETY: ICD-10-CM

## 2021-05-24 RX ORDER — ALPRAZOLAM 0.5 MG/1
TABLET ORAL
Qty: 90 TABLET | Refills: 0 | Status: SHIPPED | OUTPATIENT
Start: 2021-05-24 | End: 2021-07-01

## 2021-06-03 ENCOUNTER — TELEPHONE (OUTPATIENT)
Dept: OBGYN CLINIC | Age: 66
End: 2021-06-03

## 2021-06-04 NOTE — TELEPHONE ENCOUNTER
Spoke with the patient and tried to obtain a prior authorization for Premarin 1.25 mg tablets and received a response that there is already an authorization on file for the medication. Left a message on the patient's voicemail letting her know.

## 2021-06-04 NOTE — TELEPHONE ENCOUNTER
Left a message for the patient to return my call regarding the need for a prior auth on her prescription. The patient did not leave the name of the medication she needs the authorization for.

## 2021-06-17 ENCOUNTER — APPOINTMENT (OUTPATIENT)
Dept: GENERAL RADIOLOGY | Age: 66
End: 2021-06-17
Attending: EMERGENCY MEDICINE
Payer: MEDICARE

## 2021-06-17 ENCOUNTER — HOSPITAL ENCOUNTER (EMERGENCY)
Age: 66
Discharge: HOME | End: 2021-06-17
Payer: MEDICARE

## 2021-06-17 ENCOUNTER — HOSPITAL ENCOUNTER (EMERGENCY)
Age: 66
Discharge: HOME | End: 2021-06-17
Attending: EMERGENCY MEDICINE
Payer: MEDICARE

## 2021-06-17 VITALS
SYSTOLIC BLOOD PRESSURE: 162 MMHG | RESPIRATION RATE: 16 BRPM | WEIGHT: 108.9 LBS | HEIGHT: 62 IN | BODY MASS INDEX: 20.04 KG/M2 | HEART RATE: 84 BPM | DIASTOLIC BLOOD PRESSURE: 76 MMHG | OXYGEN SATURATION: 97 % | TEMPERATURE: 97.9 F

## 2021-06-17 VITALS
RESPIRATION RATE: 16 BRPM | TEMPERATURE: 98 F | HEART RATE: 78 BPM | HEIGHT: 62 IN | OXYGEN SATURATION: 98 % | WEIGHT: 108 LBS | BODY MASS INDEX: 19.88 KG/M2 | SYSTOLIC BLOOD PRESSURE: 183 MMHG | DIASTOLIC BLOOD PRESSURE: 82 MMHG

## 2021-06-17 DIAGNOSIS — I10 ESSENTIAL HYPERTENSION: ICD-10-CM

## 2021-06-17 DIAGNOSIS — S68.621A PARTIAL TRAUMATIC AMPUTATION OF LEFT INDEX FINGER THROUGH PHALANX, INITIAL ENCOUNTER: ICD-10-CM
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
## 2021-06-17 DIAGNOSIS — Z51.89 VISIT FOR WOUND CHECK: ICD-10-CM

## 2021-06-17 DIAGNOSIS — S68.621A PARTIAL TRAUMATIC AMPUTATION OF LEFT INDEX FINGER THROUGH PHALANX, INITIAL ENCOUNTER: Primary | ICD-10-CM

## 2021-06-17 DIAGNOSIS — S61.251A OPEN WOUND OF LEFT INDEX FINGER DUE TO DOG BITE: ICD-10-CM

## 2021-06-17 PROCEDURE — 99283 EMERGENCY DEPT VISIT LOW MDM: CPT

## 2021-06-17 PROCEDURE — 74011250637 HC RX REV CODE- 250/637: Performed by: PHYSICIAN ASSISTANT

## 2021-06-17 PROCEDURE — 90471 IMMUNIZATION ADMIN: CPT

## 2021-06-17 PROCEDURE — 75810000283 HC INJECTION NERVE BLOCK

## 2021-06-17 PROCEDURE — 99284 EMERGENCY DEPT VISIT MOD MDM: CPT

## 2021-06-17 PROCEDURE — 74011250637 HC RX REV CODE- 250/637: Performed by: EMERGENCY MEDICINE

## 2021-06-17 PROCEDURE — 73140 X-RAY EXAM OF FINGER(S): CPT

## 2021-06-17 PROCEDURE — 74011250636 HC RX REV CODE- 250/636: Performed by: EMERGENCY MEDICINE

## 2021-06-17 PROCEDURE — 90715 TDAP VACCINE 7 YRS/> IM: CPT | Performed by: EMERGENCY MEDICINE

## 2021-06-17 RX ORDER — LIDOCAINE HYDROCHLORIDE AND EPINEPHRINE 10; 10 MG/ML; UG/ML
1.5 INJECTION, SOLUTION INFILTRATION; PERINEURAL
Status: DISCONTINUED | OUTPATIENT
Start: 2021-06-17 | End: 2021-06-17 | Stop reason: HOSPADM

## 2021-06-17 RX ORDER — HYDROCODONE BITARTRATE AND ACETAMINOPHEN 5; 325 MG/1; MG/1
1 TABLET ORAL ONCE
Status: COMPLETED | OUTPATIENT
Start: 2021-06-17 | End: 2021-06-17

## 2021-06-17 RX ORDER — OXYCODONE AND ACETAMINOPHEN 10; 325 MG/1; MG/1
1 TABLET ORAL ONCE
Status: COMPLETED | OUTPATIENT
Start: 2021-06-17 | End: 2021-06-17

## 2021-06-17 RX ORDER — ONDANSETRON 4 MG/1
4 TABLET, ORALLY DISINTEGRATING ORAL
Status: COMPLETED | OUTPATIENT
Start: 2021-06-17 | End: 2021-06-17

## 2021-06-17 RX ORDER — LIDOCAINE HYDROCHLORIDE 10 MG/ML
10 INJECTION INFILTRATION; PERINEURAL ONCE
Status: DISCONTINUED | OUTPATIENT
Start: 2021-06-17 | End: 2021-06-17 | Stop reason: HOSPADM

## 2021-06-17 RX ORDER — AMOXICILLIN AND CLAVULANATE POTASSIUM 875; 125 MG/1; MG/1
1 TABLET, FILM COATED ORAL
Status: COMPLETED | OUTPATIENT
Start: 2021-06-17 | End: 2021-06-17

## 2021-06-17 RX ORDER — AMOXICILLIN AND CLAVULANATE POTASSIUM 875; 125 MG/1; MG/1
1 TABLET, FILM COATED ORAL 2 TIMES DAILY
Qty: 20 TABLET | Refills: 0 | Status: SHIPPED | OUTPATIENT
Start: 2021-06-17 | End: 2021-06-27

## 2021-06-17 RX ORDER — DOXYCYCLINE HYCLATE 100 MG
100 TABLET ORAL 2 TIMES DAILY
Qty: 14 TABLET | Refills: 0 | Status: SHIPPED | OUTPATIENT
Start: 2021-06-17 | End: 2021-06-24

## 2021-06-17 RX ORDER — HYDROCODONE BITARTRATE AND ACETAMINOPHEN 5; 325 MG/1; MG/1
1 TABLET ORAL
Qty: 12 TABLET | Refills: 0 | Status: SHIPPED | OUTPATIENT
Start: 2021-06-17 | End: 2021-06-21

## 2021-06-17 RX ORDER — ONDANSETRON 4 MG/1
4 TABLET, ORALLY DISINTEGRATING ORAL
Qty: 10 TABLET | Refills: 0 | Status: SHIPPED | OUTPATIENT
Start: 2021-06-17 | End: 2021-06-24

## 2021-06-17 RX ADMIN — TETANUS TOXOID, REDUCED DIPHTHERIA TOXOID AND ACELLULAR PERTUSSIS VACCINE, ADSORBED 0.5 ML: 5; 2.5; 8; 8; 2.5 SUSPENSION INTRAMUSCULAR at 08:02

## 2021-06-17 RX ADMIN — HYDROCODONE BITARTRATE AND ACETAMINOPHEN 1 TABLET: 5; 325 TABLET ORAL at 07:52

## 2021-06-17 RX ADMIN — OXYCODONE HYDROCHLORIDE AND ACETAMINOPHEN 1 TABLET: 10; 325 TABLET ORAL at 18:19

## 2021-06-17 RX ADMIN — AMOXICILLIN AND CLAVULANATE POTASSIUM 1 TABLET: 875; 125 TABLET, FILM COATED ORAL at 20:15

## 2021-06-17 RX ADMIN — ONDANSETRON 4 MG: 4 TABLET, ORALLY DISINTEGRATING ORAL at 19:35

## 2021-06-17 RX ADMIN — ONDANSETRON 4 MG: 4 TABLET, ORALLY DISINTEGRATING ORAL at 20:16

## 2021-06-17 NOTE — ED NOTES
Cleaned left 2nd finger with saline and betadine, placed non adherent dressing with cling and coban. Wound care instructions given and pt stated understanding .

## 2021-06-17 NOTE — ED TRIAGE NOTES
Dog bite, was seen by [facility], told to come to main er if did not stop bleeding, pt reports having to change to change bandage frequently.

## 2021-06-17 NOTE — ED PROVIDER NOTES
EMERGENCY DEPARTMENT HISTORY AND PHYSICAL EXAM      Date: 6/17/2021  Patient Name: Samira Villalba    History of Presenting Illness     Chief Complaint   Patient presents with    Dog Bite       History Provided By: Patient    HPI: Samira Villalba, 73 y.o. female with a past medical history significant anxiety, GERD, insomnia, HTN presents to the ED with cc of dog bite to left index finger onset 5 hours ago. 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 She thought he just took off her nail, but did not realize how extensive her wound was. The dog is being observed for rabies at this time. She has declined prophylaxis. Unknown last date of Tetanus. She originally presented to another facility at this time of injury. Her wound was cleaned and XR imaging was completed. Her wound was bandaged and she was told to return to this ED if the bleeding did not stop. She reports her wound was still bleeding when she left the other facility. She denies any further injury. There are no other complaints, changes, or physical findings at this time. PCP: Karissa Zelaya NP    No current facility-administered medications on file prior to encounter. Current Outpatient Medications on File Prior to Encounter   Medication Sig Dispense Refill    doxycycline (VIBRA-TABS) 100 mg tablet Take 1 Tablet by mouth two (2) times a day for 7 days. 14 Tablet 0    HYDROcodone-acetaminophen (Lorcet, HYDROcodone,) 5-325 mg per tablet Take 1 Tablet by mouth every eight (8) hours as needed for Pain for up to 4 days. Max Daily Amount: 3 Tablets.  12 Tablet 0    ALPRAZolam (XANAX) 0.5 mg tablet TAKE 1 TABLET BY MOUTH THREE TIMES A DAY 90 Tablet 0    pravastatin (PRAVACHOL) 40 mg tablet TAKE 1 TABLET BY MOUTH EVERY NIGHT AT BEDTIME 90 Tab 1    levothyroxine (SYNTHROID) 50 mcg tablet TAKE 1 TABLET BY MOUTH EVERY DAY ON EMPTY STOMACH 90 Tab 1    BIOTIN PO Take 5,000 mcg by mouth. 1 capsule PO daily      cranberry fruit extract (CRANBERRY PO) Take  by mouth. 1 capsule PO daily      hydrOXYzine HCL (ATARAX) 50 mg tablet TK 1 T PO QID PRN (Patient not taking: Reported on 6/17/2021)      aspirin delayed-release (Adult Low Dose Aspirin) 81 mg tablet Take  by mouth daily.  OTHER Cannabidiol (CBD) oral edible Take 2 edibles every day by oral route (Patient not taking: Reported on 6/17/2021)      cyclobenzaprine (FLEXERIL) 10 mg tablet Take  by mouth three (3) times daily as needed for Muscle Spasm(s). Take 1 tablet by mouth twice a day (Patient not taking: Reported on 6/17/2021)      conjugated estrogens (Premarin) 1.25 mg tablet Take 1.25 mg by mouth daily.  ascorbic acid, vitamin C, (Vitamin C) 250 mg tablet Take  by mouth. Past History     Past Medical History:  Past Medical History:   Diagnosis Date    Acquired hypothyroidism 7/15/2020    Allergic rhinitis 7/15/2020    Chronic anxiety 7/15/2020    Condyloma     Depression     Female stress incontinence     Gastroesophageal reflux disease 7/15/2020    Hypercholesterolemia 7/15/2020    Hypertension     Insomnia 7/15/2020    Menopause     Relaxation of pelvis     Relaxation of pelvic floor.  Small bowel obstruction [facility])        Past Surgical History:  Past Surgical History:   Procedure Laterality Date    HX COLONOSCOPY  2016    HX GI      Repair of small intestine.     HX GYN      Laser Condyloma    HX HERNIA REPAIR      HX HYSTERECTOMY      HX PELVIC LAPAROSCOPY      JOAQUIN    HX RECTOCELE REPAIR      Repair of enterocele    HX SALPINGO-OOPHORECTOMY Right     HX TONSILLECTOMY      MI SLING OPER STRES INCONTINENCE         Family History:  Family History   Problem Relation Age of Onset    Heart Disease Mother     Other Mother         History of TIA    Ulcerative Colitis Mother     Heart Disease Father     Ulcerative Colitis Sister     Diabetes Brother     Thyroid Disease Other         Family hx of thyroid disorder, unspecified relation.  Hypertension Other         Family hx of HTN, unspecified relation. Social History:  Social History     Tobacco Use    Smoking status: Former Smoker     Quit date:      Years since quittin.4    Smokeless tobacco: Never Used   Vaping Use    Vaping Use: Never used   Substance Use Topics    Alcohol use: Yes     Comment: Occasional use.  Drug use: Never       Allergies: Allergies   Allergen Reactions    Augmentin [Amoxicillin-Pot Clavulanate] Diarrhea    Biaxin [Clarithromycin] Other (comments)     Stomach burns    Ceftin [Cefuroxime Axetil] Other (comments)     Stomach burns    Codeine Itching    Eryc [Erythromycin] Rash    Erythromycin Base Nausea and Vomiting    Lomotil [Diphenoxylate-Atropine] Rash    Prednisone Other (comments)     confusion    Trazodone Itching    Sertraline Hives         Review of Systems   Review of Systems   Constitutional: Negative for activity change, chills and fever. HENT: Negative for congestion, ear pain, rhinorrhea, sneezing and sore throat. Eyes: Negative for pain and visual disturbance. Respiratory: Negative for cough and shortness of breath. Cardiovascular: Negative for chest pain. Gastrointestinal: Negative for abdominal pain, diarrhea, nausea and vomiting. Genitourinary: Negative for dysuria and hematuria. Musculoskeletal: Positive for arthralgias and myalgias. Negative for gait problem. Skin: Positive for wound. Negative for rash. Neurological: Negative for speech difficulty, weakness and headaches. Psychiatric/Behavioral: The patient is not nervous/anxious. All other systems reviewed and are negative. Physical Exam   Physical Exam  Vitals and nursing note reviewed. Constitutional:       General: She is not in acute distress. Appearance: Normal appearance. She is not toxic-appearing. HENT:      Head: Normocephalic and atraumatic. Nose: Nose normal.      Mouth/Throat:      Mouth: Mucous membranes are moist.   Eyes:      Extraocular Movements: Extraocular movements intact. Conjunctiva/sclera: Conjunctivae normal.      Pupils: Pupils are equal, round, and reactive to light. Cardiovascular:      Rate and Rhythm: Normal rate. Pulses: Normal pulses. Pulmonary:      Effort: Pulmonary effort is normal. No respiratory distress. Abdominal:      Tenderness: There is no abdominal tenderness. Musculoskeletal:         General: No signs of injury. Normal range of motion. Right hand: Normal.      Left hand: Deformity present. Cervical back: Normal range of motion. Comments: Left 2nd finger: +distal partial amputation including the nailbed just distal to the DIP joint, +active bleeding from wound site, sensation intact, ROM intact, no obvious bony exposure, no other injury   Skin:     General: Skin is warm and dry. Capillary Refill: Capillary refill takes less than 2 seconds. Findings: Signs of injury and wound present. No rash. Neurological:      General: No focal deficit present. Mental Status: She is alert and oriented to person, place, and time. Cranial Nerves: No cranial nerve deficit. Psychiatric:         Mood and Affect: Mood normal. Affect is tearful. Diagnostic Study Results     Labs -   No results found for this or any previous visit (from the past 48 hour(s)). Radiologic Studies -   Results from [place] encounter on 06/17/21    XR 2ND FINGER LT MIN 2 V    Narrative  3 views of the left second finger. Comparison with October 31, 2009. INDICATION: Dog bite. Impression  Interval amputation of the distal tuft of the distal phalanx of the  left second finger with associated soft tissue loss. No radiopaque foreign body. Degenerative changes of the IP joint of the thumb and the first carpal  metacarpal joint.      CT Results  (Last 48 hours)    None          Medical Decision Making   I am the first provider for this patient. I reviewed the vital signs, available nursing notes, past medical history, past surgical history, family history and social history. Vital Signs-Reviewed the patient's vital signs. Wt Readings from Last 3 Encounters:   06/17/21 49 kg (108 lb)   06/17/21 49.4 kg (108 lb 14.4 oz)   03/22/21 54.3 kg (119 lb 12.8 oz)     Temp Readings from Last 3 Encounters:   06/17/21 98 °F (36.7 °C)   06/17/21 97.9 °F (36.6 °C)   03/22/21 97.3 °F (36.3 °C) (Temporal)     BP Readings from Last 3 Encounters:   06/17/21 (!) 183/82   06/17/21 (!) 162/76   03/22/21 130/68     Pulse Readings from Last 3 Encounters:   06/17/21 78   06/17/21 84   03/22/21 85       No data found. Records Reviewed: Nursing Notes, Old Medical Records and Previous Radiology Studies    Provider Notes (Medical Decision Making):     MDM  Number of Diagnoses or Management Options     Amount and/or Complexity of Data Reviewed  Tests in the radiology section of CPT®: reviewed  Review and summarize past medical records: yes        ED Course:   Initial assessment performed. The patients presenting problems have been discussed, and they are in agreement with the care plan formulated and outlined with them. I have encouraged them to ask questions as they arise throughout their visit.            PROCEDURES    Nerve Block    Date/Time: 6/17/2021 4:41 PM  Performed by: Garima Edmonds PA-C  Authorized by: Garima Edmonds PA-C     Consent:     Consent obtained:  Verbal    Consent given by:  Patient    Risks discussed:  Pain, unsuccessful block, swelling and bleeding    Alternatives discussed:  Alternative treatment  Indications:     Indications:  Pain relief and procedural anesthesia  Location:     Body area:  Upper extremity    Upper extremity nerve:  Metacarpal    Laterality:  Left  Pre-procedure details:     Skin preparation:  Alcohol    Preparation: Patient was prepped and draped in usual sterile fashion    Skin anesthesia (see MAR for exact dosages):     Skin anesthesia method:  Local infiltration  Procedure details (see MAR for exact dosages): Block needle gauge:  22 G    Anesthetic injected:  Lidocaine 1% WITH epi and lidocaine 1% w/o epi    Steroid injected:  None    Additive injected:  None    Injection procedure:  Anatomic landmarks identified, negative aspiration for blood and introduced needle    Paresthesia:  None  Post-procedure details:     Dressing:  None    Outcome:  Pain relieved    Patient tolerance of procedure:   Tolerated well, no immediate complications  Critical Care  Performed by: Mary Ellen Falcon PA-C  Authorized by: Mary Ellen Falcon PA-C     Critical care provider statement:     Critical care time (minutes):  40    Critical care start time:  6/17/2021 4:40 PM    Critical care end time:  6/17/2021 7:30 PM    Critical care time was exclusive of:  Separately billable procedures and treating other patients    Critical care was necessary to treat or prevent imminent or life-threatening deterioration of the following conditions:  Trauma (bleeding)    Critical care was time spent personally by me on the following activities:  Ordering and performing treatments and interventions, obtaining history from patient or surrogate, interpretation of cardiac output measurements, examination of patient, evaluation of patient's response to treatment, discussions with consultants, development of treatment plan with patient or surrogate, review of old charts and re-evaluation of patient's condition    I assumed direction of critical care for this patient from another provider in my specialty: no    Other Procedure    Date/Time: 6/17/2021 5:00 PM  Performed by: Mary Ellen Falcon PA-C  Authorized by: Mary Ellen Falcon PA-C     Consent:     Consent obtained:  Verbal    Consent given by:  Patient    Risks discussed:  Bleeding    Alternatives discussed:  Delayed treatment, alternative treatment, observation and referral  Indications:     Indications:  Bleeding from wound  Anesthesia (see MAR for exact dosages): Anesthesia method:  Local infiltration    Local anesthetic:  Lidocaine 1% WITH epi and lidocaine 1% w/o epi  Comments:      Multiple attempts to achieve hemostasis of left index finger wound (dog bite). Quick clot dressing applied with improvement of bleeding. Lido with Epi injected into wound site. Hemostasis achieved. Wound was dressed with Xeroform and pressure dressing. 7:38 PM  Dr. Aleksandra Brandon to bedside after digital block for evaluation of wound. Bleeding subsided. Disposition     Disposition: DC- Adult Discharges: All of the diagnostic tests were reviewed and questions answered. Diagnosis, care plan and treatment options were discussed. The patient understands the instructions and will follow up as directed. The patients results have been reviewed with them. They have been counseled regarding their diagnosis. The patient verbally convey understanding and agreement of the signs, symptoms, diagnosis, treatment and prognosis and additionally agrees to follow up as recommended with their PCP in 24 - 48 hours. They also agree with the care-plan and convey that all of their questions have been answered. I have also put together some discharge instructions for them that include: 1) educational information regarding their diagnosis, 2) how to care for their diagnosis at home, as well a 3) list of reasons why they would want to return to the ED prior to their follow-up appointment, should their condition change. Discharged    DISCHARGE PLAN:  1. Current Discharge Medication List      CONTINUE these medications which have NOT CHANGED    Details   doxycycline (VIBRA-TABS) 100 mg tablet Take 1 Tablet by mouth two (2) times a day for 7 days.   Qty: 14 Tablet, Refills: 0      HYDROcodone-acetaminophen (Lorcet, HYDROcodone,) 5-325 mg per tablet Take 1 Tablet by mouth every eight (8) hours as needed for Pain for up to 4 days. Max Daily Amount: 3 Tablets. 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, initial encounter      ALPRAZolam (XANAX) 0.5 mg tablet TAKE 1 TABLET BY MOUTH THREE TIMES A DAY  Qty: 90 Tablet, Refills: 0    Comments: Not to exceed 5 additional fills before 10/19/2021  Associated Diagnoses: Chronic anxiety      dilTIAZem IR (CARDIZEM) 120 mg tablet TAKE 1/2 TABLET BY MOUTH TWICE DAILY  Qty: 90 Tab, Refills: 1      pravastatin (PRAVACHOL) 40 mg tablet TAKE 1 TABLET BY MOUTH EVERY NIGHT AT BEDTIME  Qty: 90 Tab, Refills: 1      levothyroxine (SYNTHROID) 50 mcg tablet TAKE 1 TABLET BY MOUTH EVERY DAY ON EMPTY STOMACH  Qty: 90 Tab, Refills: 1      buPROPion SR (WELLBUTRIN SR) 150 mg SR tablet TAKE 1 TABLET BY MOUTH TWICE DAILY  Qty: 180 Tab, Refills: 1      BIOTIN PO Take 5,000 mcg by mouth. 1 capsule PO daily      cranberry fruit extract (CRANBERRY PO) Take  by mouth. 1 capsule PO daily      hydrOXYzine HCL (ATARAX) 50 mg tablet TK 1 T PO QID PRN      sertraline (ZOLOFT) 25 mg tablet 25 mg. Take one by mouth in the morning      aspirin delayed-release (Adult Low Dose Aspirin) 81 mg tablet Take  by mouth daily. OTHER Cannabidiol (CBD) oral edible Take 2 edibles every day by oral route      cyclobenzaprine (FLEXERIL) 10 mg tablet Take  by mouth three (3) times daily as needed for Muscle Spasm(s). Take 1 tablet by mouth twice a day      conjugated estrogens (Premarin) 1.25 mg tablet Take 1.25 mg by mouth daily. ascorbic acid, vitamin C, (Vitamin C) 250 mg tablet Take  by mouth.            2.   Follow-up Information     Follow up With Specialties Details Why Contact Info    [facility]  Go in 1 day for follow up from ER visit [address] [address] [phone]    [facility] EMERGENCY DEPT Emergency Medicine  As needed, If symptoms worsen [address] [facility] [address]  [phone]        3. Return to ED if worse   4. Discharge Medication List as of 6/17/2021  8:04 PM      START taking these medications    Details   amoxicillin-clavulanate (Augmentin) 875-125 mg per tablet Take 1 Tablet by mouth two (2) times a day for 10 days. , Normal, Disp-20 Tablet, R-0      ondansetron (Zofran ODT) 4 mg disintegrating tablet Take 1 Tablet by mouth every eight (8) hours as needed for Nausea or Nausea or Vomiting for up to 7 days. , Normal, Disp-10 Tablet, R-0         CONTINUE these medications which have NOT CHANGED    Details   doxycycline (VIBRA-TABS) 100 mg tablet Take 1 Tablet by mouth two (2) times a day for 7 days. , Normal, Disp-14 Tablet, R-0      HYDROcodone-acetaminophen (Lorcet, HYDROcodone,) 5-325 mg per tablet Take 1 Tablet by mouth every eight (8) hours as needed for Pain for up to 4 days. Max Daily Amount: 3 Tablets., Normal, Disp-12 Tablet, R-0      ALPRAZolam (XANAX) 0.5 mg tablet TAKE 1 TABLET BY MOUTH THREE TIMES A DAY, Normal, Disp-90 Tablet, R-0Not to exceed 5 additional fills before 10/19/2021      dilTIAZem IR (CARDIZEM) 120 mg tablet TAKE 1/2 TABLET BY MOUTH TWICE DAILY, Normal, Disp-90 Tab, R-1      pravastatin (PRAVACHOL) 40 mg tablet TAKE 1 TABLET BY MOUTH EVERY NIGHT AT BEDTIME, Normal, Disp-90 Tab, R-1      levothyroxine (SYNTHROID) 50 mcg tablet TAKE 1 TABLET BY MOUTH EVERY DAY ON EMPTY STOMACH, Normal, Disp-90 Tab, R-1      buPROPion SR (WELLBUTRIN SR) 150 mg SR tablet TAKE 1 TABLET BY MOUTH TWICE DAILY, Normal, Disp-180 Tab,R-1      BIOTIN PO Take 5,000 mcg by mouth. 1 capsule PO daily, Historical Med      cranberry fruit extract (CRANBERRY PO) Take  by mouth. 1 capsule PO daily, Historical Med      hydrOXYzine HCL (ATARAX) 50 mg tablet TK 1 T PO QID PRN, Historical Med      sertraline (ZOLOFT) 25 mg tablet 25 mg. Take one by mouth in the morning, Historical Med      aspirin delayed-release (Adult Low Dose Aspirin) 81 mg tablet Take  by mouth daily. , Historical Med      OTHER Cannabidiol (CBD) oral edible Take 2 edibles every day by oral route, Historical Med      cyclobenzaprine (FLEXERIL) 10 mg tablet Take  by mouth three (3) times daily as needed for Muscle Spasm(s). Take 1 tablet by mouth twice a day, Historical Med      conjugated estrogens (Premarin) 1.25 mg tablet Take 1.25 mg by mouth daily. , Historical Med      ascorbic acid, vitamin C, (Vitamin C) 250 mg tablet Take  by mouth., Historical Med             Diagnosis     Clinical Impression:   1. Dog bite, initial encounter    2. 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  Visit for wound check

## 2021-06-17 NOTE — ED NOTES
Called [facility] [phone] at [phone], Gave pt and dog owner information, [employer] will call back later. Dog is Graeme Thomas is Sallie Carlson, [address] [facility]  Phone [phone]. Incident occurred at [address] [facility], [address] [place] [address], [facility]. Pt's number [phone]. Officer Julianna Gonsalves called approx [phone] today for more information and will contact dog owner and pt after discharge.

## 2021-06-17 NOTE — ED PROVIDER NOTES
EMERGENCY DEPARTMENT HISTORY AND PHYSICAL EXAM      Date: 6/17/2021  Patient Name: Anibal Muñoz      History of Presenting Illness     Chief Complaint   Patient presents with    Dog Bite       History Provided By: Patient    HPI: Anibal Muñoz, 73 y.o. female with a past medical history significant HTN, Depression presents to the ED with cc of Dog bite to left index finger JPTA. Dog is a kennel dog she was babysitting. Rabies shot expires in 3 days. Dog can be observed. Dog bit off tip of left index which is missing and probably swallowed. There are no other complaints, changes, or physical findings at this time. PCP: Elsi Beauchamp NP    Current Outpatient Medications   Medication Sig Dispense Refill    doxycycline (VIBRA-TABS) 100 mg tablet Take 1 Tablet by mouth two (2) times a day for 7 days. 14 Tablet 0    HYDROcodone-acetaminophen (Lorcet, HYDROcodone,) 5-325 mg per tablet Take 1 Tablet by mouth every eight (8) hours as needed for Pain for up to 4 days. Max Daily Amount: 3 Tablets. 12 Tablet 0    ALPRAZolam (XANAX) 0.5 mg tablet TAKE 1 TABLET BY MOUTH THREE TIMES A DAY 90 Tablet 0    dilTIAZem IR (CARDIZEM) 120 mg tablet TAKE 1/2 TABLET BY MOUTH TWICE DAILY 90 Tab 1    pravastatin (PRAVACHOL) 40 mg tablet TAKE 1 TABLET BY MOUTH EVERY NIGHT AT BEDTIME 90 Tab 1    levothyroxine (SYNTHROID) 50 mcg tablet TAKE 1 TABLET BY MOUTH EVERY DAY ON EMPTY STOMACH 90 Tab 1    buPROPion SR (WELLBUTRIN SR) 150 mg SR tablet TAKE 1 TABLET BY MOUTH TWICE DAILY 180 Tab 1    BIOTIN PO Take 5,000 mcg by mouth. 1 capsule PO daily      cranberry fruit extract (CRANBERRY PO) Take  by mouth. 1 capsule PO daily      aspirin delayed-release (Adult Low Dose Aspirin) 81 mg tablet Take  by mouth daily.  conjugated estrogens (Premarin) 1.25 mg tablet Take 1.25 mg by mouth daily.  ascorbic acid, vitamin C, (Vitamin C) 250 mg tablet Take  by mouth.       hydrOXYzine HCL (ATARAX) 50 mg tablet TK 1 T PO QID PRN (Patient not taking: Reported on 2021)      sertraline (ZOLOFT) 25 mg tablet 25 mg. Take one by mouth in the morning (Patient not taking: Reported on 2021)      OTHER Cannabidiol (CBD) oral edible Take 2 edibles every day by oral route (Patient not taking: Reported on 2021)      cyclobenzaprine (FLEXERIL) 10 mg tablet Take  by mouth three (3) times daily as needed for Muscle Spasm(s). Take 1 tablet by mouth twice a day (Patient not taking: Reported on 2021)         Past History     Past Medical History:  Past Medical History:   Diagnosis Date    Acquired hypothyroidism 7/15/2020    Allergic rhinitis 7/15/2020    Chronic anxiety 7/15/2020    Condyloma     Depression     Female stress incontinence     Gastroesophageal reflux disease 7/15/2020    Hypercholesterolemia 7/15/2020    Hypertension     Insomnia 7/15/2020    Menopause     Relaxation of pelvis     Relaxation of pelvic floor.  [facility] bowel obstruction [facility])        Past Surgical History:  Past Surgical History:   Procedure Laterality Date    HX COLONOSCOPY      HX GI      Repair of small intestine.  HX GYN      Laser Condyloma    HX HERNIA REPAIR      HX HYSTERECTOMY      HX PELVIC LAPAROSCOPY      JOAQUIN    HX RECTOCELE REPAIR      Repair of enterocele    HX SALPINGO-OOPHORECTOMY Right     HX TONSILLECTOMY      GA SLING OPER STRES INCONTINENCE         Family History:  Family History   Problem Relation Age of Onset    Heart Disease Mother     Other Mother         History of TIA    Ulcerative Colitis Mother     Heart Disease Father     Ulcerative Colitis Sister     Diabetes Brother     Thyroid Disease Other         Family hx of thyroid disorder, unspecified relation.  Hypertension Other         Family hx of HTN, unspecified relation.        Social History:  Social History     Tobacco Use    Smoking status: Former Smoker     Quit date:      Years since quittin.4    Smokeless tobacco: Never Used   Vaping Use    Vaping Use: Never used   Substance Use Topics    Alcohol use: Yes     Comment: Occasional use.  Drug use: Never       Allergies: Allergies   Allergen Reactions    Augmentin [Amoxicillin-Pot Clavulanate] Diarrhea    Biaxin [Clarithromycin] Other (comments)     Stomach burns    Ceftin [Cefuroxime Axetil] Other (comments)     Stomach burns    Codeine Itching    Eryc [Erythromycin] Rash    Erythromycin Base Nausea and Vomiting    Lomotil [Diphenoxylate-Atropine] Rash    Prednisone Other (comments)     confusion    Trazodone Itching    Sertraline Hives         Review of Systems     Review of Systems   Constitutional: Negative. HENT: Negative. Respiratory: Negative. Cardiovascular: Negative. Gastrointestinal: Negative. Genitourinary: Negative. Musculoskeletal:        Dogbite left index   Neurological: Negative. All other systems reviewed and are negative. Physical Exam     Physical Exam  Vitals and nursing note reviewed. Constitutional:       Appearance: Normal appearance. HENT:      Head: Normocephalic. Eyes:      Extraocular Movements: Extraocular movements intact. Pupils: Pupils are equal, round, and reactive to light. Cardiovascular:      Rate and Rhythm: Normal rate and regular rhythm. Pulses: Normal pulses. Heart sounds: Normal heart sounds. Pulmonary:      Effort: Pulmonary effort is normal.      Breath sounds: Normal breath sounds. Abdominal:      General: Abdomen is flat. Palpations: Abdomen is soft. Musculoskeletal:      Cervical back: Normal range of motion and neck supple. Comments: Amputated left index finger distal phalanx tip involving ungual bed. No active bleeding. Neurological:      Mental Status: She is alert. Lab and Diagnostic Study Results     Labs -   No results found for this or any previous visit (from the past 12 hour(s)).     Radiologic Studies -   [unfilled]  CT Results  (Last 48 hours)    None        CXR Results  (Last 48 hours)    None          Medical Decision Making and ED Course   - I am the first and primary provider for this patient AND AM THE PRIMARY PROVIDER OF RECORD. - I reviewed the vital signs, available nursing notes, past medical history, past surgical history, family history and social history. - Initial assessment performed. The patients presenting problems have been discussed, and the staff are in agreement with the care plan formulated and outlined with them. I have encouraged them to ask questions as they arise throughout their visit. Vital Signs-Reviewed the patient's vital signs. Patient Vitals for the past 12 hrs:   Temp Pulse Resp BP SpO2   06/17/21 0859  84 16 (!) 162/76 97 %   06/17/21 0724 97.9 °F (36.6 °C) 89 18 (!) 192/91 98 %         Records Reviewed: Nursing Notes    The patient presents with     ED Course:              Provider Notes (Medical Decision Making):     MDM  Number of Diagnoses or Management Options     Amount and/or Complexity of Data Reviewed  Tests in the radiology section of CPT®: ordered and reviewed    Risk of Complications, Morbidity, and/or Mortality  Presenting problems: low  Diagnostic procedures: low  Management options: low  General comments: Patient declined hand specialist evaluation and says she will not follow up with one either. I still gave her Dr Calderon Fang in case she changes her mind. Patient also declined Naloxone prescription which was offered because of her history of chronic benzo use. Consultations:       Consultations:         Procedures and Critical Care       Performed by: Maria Isabel Ramos MD  PROCEDURES:  Procedures         HYPERTENSION COUNSELING: Education was provided to the patient today regarding their hypertension. Patient is made aware of their elevated blood pressure and is instructed to follow up this week with their Primary Care for a recheck.  Patient is counseled regarding consequences of chronic, uncontrolled hypertension including kidney disease, heart disease, stroke or even death. Patient states their understanding and agrees to follow up this week. Additionally, during their visit, I discussed sodium restriction, maintaining ideal body weight and regular exercise program as physiologic means to achieve blood pressure control. The patient will strive towards this. CRITICAL CARE NOTE :  7:43 AM      Disposition     Disposition: DC- Adult Discharges: All of the diagnostic tests were reviewed and questions answered. Diagnosis, care plan and treatment options were discussed. The patient understands the instructions and will follow up as directed. The patients results have been reviewed with them. They have been counseled regarding their diagnosis. The patient verbally convey understanding and agreement of the signs, symptoms, diagnosis, treatment and prognosis and additionally agrees to follow up as recommended with their PCP in 24 - 48 hours. They also agree with the care-plan and convey that all of their questions have been answered. I have also put together some discharge instructions for them that include: 1) educational information regarding their diagnosis, 2) how to care for their diagnosis at home, as well a 3) list of reasons why they would want to return to the ED prior to their follow-up appointment, should their condition change. Remove if not discharged  DISCHARGE PLAN:  1.    Current Discharge Medication List      CONTINUE these medications which have NOT CHANGED    Details   ALPRAZolam (XANAX) 0.5 mg tablet TAKE 1 TABLET BY MOUTH THREE TIMES A DAY  Qty: 90 Tablet, Refills: 0    Comments: Not to exceed 5 additional fills before 10/19/2021  Associated Diagnoses: Chronic anxiety      dilTIAZem IR (CARDIZEM) 120 mg tablet TAKE 1/2 TABLET BY MOUTH TWICE DAILY  Qty: 90 Tab, Refills: 1      pravastatin (PRAVACHOL) 40 mg tablet TAKE 1 TABLET BY MOUTH EVERY NIGHT AT BEDTIME  Qty: 90 Tab, Refills: 1      levothyroxine (SYNTHROID) 50 mcg tablet TAKE 1 TABLET BY MOUTH EVERY DAY ON EMPTY STOMACH  Qty: 90 Tab, Refills: 1      buPROPion SR (WELLBUTRIN SR) 150 mg SR tablet TAKE 1 TABLET BY MOUTH TWICE DAILY  Qty: 180 Tab, Refills: 1      BIOTIN PO Take 5,000 mcg by mouth. 1 capsule PO daily      cranberry fruit extract (CRANBERRY PO) Take  by mouth. 1 capsule PO daily      aspirin delayed-release (Adult Low Dose Aspirin) 81 mg tablet Take  by mouth daily. conjugated estrogens (Premarin) 1.25 mg tablet Take 1.25 mg by mouth daily. ascorbic acid, vitamin C, (Vitamin C) 250 mg tablet Take  by mouth. hydrOXYzine HCL (ATARAX) 50 mg tablet TK 1 T PO QID PRN      sertraline (ZOLOFT) 25 mg tablet 25 mg. Take one by mouth in the morning      OTHER Cannabidiol (CBD) oral edible Take 2 edibles every day by oral route      cyclobenzaprine (FLEXERIL) 10 mg tablet Take  by mouth three (3) times daily as needed for Muscle Spasm(s). Take 1 tablet by mouth twice a day           2. Follow-up Information     Follow up With Specialties Details Why Contact Info    Alvarez Ivey NP Nurse Practitioner In 3 days  [address] [facility] [place] M[place]anld MD Orthopedic Surgery In 2 days  [address]  [phone]          3. Return to ED if worse   4. Current Discharge Medication List      START taking these medications    Details   doxycycline (VIBRA-TABS) 100 mg tablet Take 1 Tablet by mouth two (2) times a day for 7 days. Qty: 14 Tablet, Refills: 0  Start date: 6/17/2021, End date: 6/24/2021      HYDROcodone-acetaminophen (Lorcet, HYDROcodone,) 5-325 mg per tablet Take 1 Tablet by mouth every eight (8) hours as needed for Pain for up to 4 days. Max Daily Amount: 3 Tablets.   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 Open wound of left index finger due to dog bite    3. Essential hypertension        Attestations:    Igor Weinstein MD    Please note that this dictation was completed with [employer], the computer voice recognition software. Quite often unanticipated grammatical, syntax, homophones, and other interpretive errors are inadvertently transcribed by the computer software. Please disregard these errors. Please excuse any errors that have escaped final proofreading. Thank you.

## 2021-06-17 NOTE — ED TRIAGE NOTES
Work at [employer] and Boarded dog bit her on left 2nd finger, last distal amputation from nail bed. Dog has rabies shots but runs out in 3 days. Tetanus is greater than 5 years. Slow bleed noted.

## 2021-06-18 ENCOUNTER — TELEPHONE (OUTPATIENT)
Dept: FAMILY MEDICINE CLINIC | Age: 66
End: 2021-06-18

## 2021-06-18 RX ORDER — BUPROPION HYDROCHLORIDE 150 MG/1
TABLET, EXTENDED RELEASE ORAL
Qty: 180 TABLET | Refills: 1 | Status: SHIPPED | OUTPATIENT
Start: 2021-06-18 | End: 2021-12-20

## 2021-06-18 RX ORDER — DILTIAZEM HYDROCHLORIDE 120 MG/1
TABLET, FILM COATED ORAL
Qty: 90 TABLET | Refills: 1 | Status: SHIPPED | OUTPATIENT
Start: 2021-06-18

## 2021-06-18 NOTE — TELEPHONE ENCOUNTER
Refill request for dilTIAZem  mg tablet and buPROPion  mg SR tablet. Please send RX to [facility] in [place] due to pt having medicare she also might need a 90 day supply

## 2021-06-19 NOTE — ED NOTES
6:38 AM    Late entry. On 6/17/2021, on patient's arrival, patient's left index finger was covered with dressing that was bloody. KYRIE Boucher saw and assessed patient. When patient's finger undressed, tip of finger noted to be gone. KYRIE Boucher redressed finger after cleaning, and by 1900 when this nurse left, Ria MITTAL was redressing finger with a different gauze and quik clot. Patient with positive pulses and adequate cap refill.

## 2021-07-01 DIAGNOSIS — F41.9 CHRONIC ANXIETY: ICD-10-CM

## 2021-07-01 RX ORDER — ALPRAZOLAM 0.5 MG/1
TABLET ORAL
Qty: 90 TABLET | Refills: 0 | Status: SHIPPED | OUTPATIENT
Start: 2021-07-01 | End: 2021-08-02

## 2021-07-12 ENCOUNTER — HOSPITAL ENCOUNTER (EMERGENCY)
Age: 66
Discharge: HOME | End: 2021-07-12
Payer: MEDICARE

## 2021-07-12 ENCOUNTER — APPOINTMENT (OUTPATIENT)
Dept: GENERAL RADIOLOGY | Age: 66
End: 2021-07-12
Attending: NURSE PRACTITIONER
Payer: MEDICARE

## 2021-07-12 VITALS
OXYGEN SATURATION: 97 % | HEIGHT: 62 IN | RESPIRATION RATE: 16 BRPM | BODY MASS INDEX: 20.24 KG/M2 | WEIGHT: 110 LBS | HEART RATE: 83 BPM | DIASTOLIC BLOOD PRESSURE: 74 MMHG | SYSTOLIC BLOOD PRESSURE: 152 MMHG | TEMPERATURE: 97.8 F

## 2021-07-12 DIAGNOSIS — M19.042 ARTHRITIS OF LEFT HAND: Primary | ICD-10-CM

## 2021-07-12 PROCEDURE — 74011250637 HC RX REV CODE- 250/637: Performed by: NURSE PRACTITIONER

## 2021-07-12 PROCEDURE — 96372 THER/PROPH/DIAG INJ SC/IM: CPT

## 2021-07-12 PROCEDURE — 74011250636 HC RX REV CODE- 250/636: Performed by: NURSE PRACTITIONER

## 2021-07-12 PROCEDURE — 99283 EMERGENCY DEPT VISIT LOW MDM: CPT

## 2021-07-12 PROCEDURE — 73130 X-RAY EXAM OF HAND: CPT

## 2021-07-12 RX ORDER — ONDANSETRON 4 MG/1
4 TABLET, ORALLY DISINTEGRATING ORAL
Qty: 10 TABLET | Refills: 1 | Status: SHIPPED | OUTPATIENT
Start: 2021-07-12 | End: 2022-04-14

## 2021-07-12 RX ORDER — HYDROMORPHONE HYDROCHLORIDE 1 MG/ML
1 INJECTION, SOLUTION INTRAMUSCULAR; INTRAVENOUS; SUBCUTANEOUS ONCE
Status: COMPLETED | OUTPATIENT
Start: 2021-07-12 | End: 2021-07-12

## 2021-07-12 RX ORDER — HYDROCODONE BITARTRATE AND ACETAMINOPHEN 5; 325 MG/1; MG/1
1 TABLET ORAL
Qty: 12 TABLET | Refills: 0 | Status: SHIPPED | OUTPATIENT
Start: 2021-07-12 | End: 2021-07-15

## 2021-07-12 RX ORDER — ONDANSETRON 4 MG/1
4 TABLET, ORALLY DISINTEGRATING ORAL
Status: COMPLETED | OUTPATIENT
Start: 2021-07-12 | End: 2021-07-12

## 2021-07-12 RX ORDER — NAPROXEN 500 MG/1
500 TABLET ORAL 2 TIMES DAILY WITH MEALS
Qty: 20 TABLET | Refills: 0 | Status: SHIPPED | OUTPATIENT
Start: 2021-07-12 | End: 2021-07-22

## 2021-07-12 RX ADMIN — HYDROMORPHONE HYDROCHLORIDE 1 MG: 1 INJECTION, SOLUTION INTRAMUSCULAR; INTRAVENOUS; SUBCUTANEOUS at 10:56

## 2021-07-12 RX ADMIN — ONDANSETRON 4 MG: 4 TABLET, ORALLY DISINTEGRATING ORAL at 10:56

## 2021-07-12 NOTE — ED PROVIDER NOTES
EMERGENCY DEPARTMENT HISTORY AND PHYSICAL EXAM      Date: 7/12/2021  Patient Name: Franchesca Thomas    History of Presenting Illness     Chief Complaint   Patient presents with    Finger Pain       History Provided By: Patient    HPI: Franchesca Thomas, 73 y.o. female with a past medical history significant GERD, anxiety, hypothyroidism, allergic rhinitis presents to the ED with cc of thumb pain. 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 Patient has been following with Dr. Vanessa Pretty. Patient was seen last week. Patient had pain and swelling to her left arm at that time but Dr. Vanessa Pretty said she would x-ray it on the next visit in 2 weeks. Patient states she did not sleep last night due to the pain so came in for evaluation. Moderate severity, no known exacerbating or relieving factors, no other associated signs and symptoms. Patient specifically denies fever, chills, chest pain, shortness of breath, abdominal pain, nausea, vomiting, diarrhea. There are no other complaints, changes, or physical findings at this time. PCP: Perri Angel NP    No current facility-administered medications on file prior to encounter. Current Outpatient Medications on File Prior to Encounter   Medication Sig Dispense Refill    ALPRAZolam (XANAX) 0.5 mg tablet TAKE 1 TABLET BY MOUTH THREE TIMES A DAY 90 Tablet 0    dilTIAZem IR (CARDIZEM) 120 mg tablet TAKE 1/2 TABLET BY MOUTH TWICE DAILY 90 Tablet 1    buPROPion SR (WELLBUTRIN SR) 150 mg SR tablet TAKE 1 TABLET BY MOUTH TWICE DAILY 180 Tablet 1    pravastatin (PRAVACHOL) 40 mg tablet TAKE 1 TABLET BY MOUTH EVERY NIGHT AT BEDTIME 90 Tab 1    levothyroxine (SYNTHROID) 50 mcg tablet TAKE 1 TABLET BY MOUTH EVERY DAY ON EMPTY STOMACH 90 Tab 1    BIOTIN PO Take 5,000 mcg by mouth. 1 capsule PO daily      cranberry fruit extract (CRANBERRY PO) Take  by mouth.  1 capsule PO daily      hydrOXYzine HCL (ATARAX) 50 mg tablet TK 1 T PO QID PRN (Patient not taking: Reported on 6/17/2021)      aspirin delayed-release (Adult Low Dose Aspirin) 81 mg tablet Take  by mouth daily.  OTHER Cannabidiol (CBD) oral edible Take 2 edibles every day by oral route (Patient not taking: Reported on 6/17/2021)      cyclobenzaprine (FLEXERIL) 10 mg tablet Take  by mouth three (3) times daily as needed for Muscle Spasm(s). Take 1 tablet by mouth twice a day (Patient not taking: Reported on 6/17/2021)      conjugated estrogens (Premarin) 1.25 mg tablet Take 1.25 mg by mouth daily.  ascorbic acid, vitamin C, (Vitamin C) 250 mg tablet Take  by mouth. Past History     Past Medical History:  Past Medical History:   Diagnosis Date    Acquired hypothyroidism 7/15/2020    Allergic rhinitis 7/15/2020    Chronic anxiety 7/15/2020    Condyloma     Depression     Female stress incontinence     Hypercholesterolemia 7/15/2020    Hypertension     Insomnia 7/15/2020    Menopause     Relaxation of pelvis     Relaxation of pelvic floor.  Small bowel obstruction [facility])        Past Surgical History:  Past Surgical History:   Procedure Laterality Date    HX COLONOSCOPY  2016    HX GI      Repair of small intestine.  HX GYN      Laser Condyloma    HX HERNIA REPAIR      HX HYSTERECTOMY      HX PELVIC LAPAROSCOPY      JOAQUIN    HX RECTOCELE REPAIR      Repair of enterocele    HX SALPINGO-OOPHORECTOMY Right     HX TONSILLECTOMY      AR SLING OPER STRES INCONTINENCE         Family History:  Family History   Problem Relation Age of Onset    Heart Disease Mother     Other Mother         History of TIA    Ulcerative Colitis Mother     Heart Disease Father     Ulcerative Colitis Sister     Diabetes Brother     Thyroid Disease Other         Family hx of thyroid disorder, unspecified relation.  Hypertension Other         Family hx of HTN, unspecified relation.        Social History:  Social History     Tobacco Use    Smoking status: Former Smoker     Quit date: 26     Years since quittin.8    Smokeless tobacco: Never Used   Vaping Use    Vaping Use: Never used   Substance Use Topics    Alcohol use: Yes     Comment: Occasional use.  Drug use: Not Currently       Allergies: Allergies   Allergen Reactions    Augmentin [Amoxicillin-Pot Clavulanate] Diarrhea    Biaxin [Clarithromycin] Other (comments)     Stomach burns    Ceftin [Cefuroxime Axetil] Other (comments)     Stomach burns    Codeine Itching    Eryc [Erythromycin] Rash    Erythromycin Base Nausea and Vomiting    Lomotil [Diphenoxylate-Atropine] Rash    Prednisone Other (comments)     confusion    Trazodone Itching    Sertraline Hives         Review of Systems     Review of Systems   Constitutional: Negative for chills, fatigue and fever. HENT: Negative for congestion, sinus pressure and trouble swallowing. Eyes: Negative for photophobia and pain. Respiratory: Negative for cough and shortness of breath. Cardiovascular: Negative for chest pain and leg swelling. Gastrointestinal: Negative for abdominal pain, diarrhea, nausea and vomiting. Endocrine: Negative for polydipsia, polyphagia and polyuria. Genitourinary: Negative for decreased urine volume, difficulty urinating, dysuria, hematuria and urgency. Musculoskeletal: Positive for arthralgias and joint swelling. Negative for back pain, gait problem, myalgias and neck pain. Skin: Negative for pallor and rash. Allergic/Immunologic: Negative for environmental allergies and food allergies. Neurological: Negative for dizziness, facial asymmetry, speech difficulty, numbness and headaches. Hematological: Negative for adenopathy. Does not bruise/bleed easily. Psychiatric/Behavioral: Negative for agitation, self-injury and suicidal ideas. The patient is not nervous/anxious. Physical Exam     Physical Exam  Vitals and nursing note reviewed. Constitutional:       Appearance: Normal appearance. HENT:      Head: Atraumatic. Right Ear: Tympanic membrane and external ear normal.      Left Ear: Tympanic membrane and external ear normal.      Nose: Nose normal.      Mouth/Throat:      Mouth: Mucous membranes are moist.   Eyes:      Extraocular Movements: Extraocular movements intact. Pupils: Pupils are equal, round, and reactive to light. Cardiovascular:      Rate and Rhythm: Normal rate and regular rhythm. Pulses: Normal pulses. Heart sounds: Normal heart sounds. Pulmonary:      Breath sounds: Normal breath sounds. Abdominal:      General: Abdomen is flat. Palpations: Abdomen is soft. Musculoskeletal:      Left hand: Swelling, tenderness and bony tenderness present. Decreased range of motion. Decreased strength of finger abduction and thumb/finger opposition. Arms:       Cervical back: Normal range of motion and neck supple. Skin:     General: Skin is warm and dry. Capillary Refill: Capillary refill takes less than 2 seconds. Neurological:      General: No focal deficit present. Mental Status: She is alert and oriented to person, place, and time. Mental status is at baseline. Psychiatric:         Mood and Affect: Mood normal.         Behavior: Behavior normal.         Lab and Diagnostic Study Results     Labs -   No results found for this or any previous visit (from the past 12 hour(s)). Radiologic Studies -   @lastxrresult@  CT Results  (Last 48 hours)    None        CXR Results  (Last 48 hours)    None            Medical Decision Making   - I am the first provider for this patient. - I reviewed the vital signs, available nursing notes, past medical history, past surgical history, family history and social history. - Initial assessment performed. The patients presenting problems have been discussed, and they are in agreement with the care plan formulated and outlined with them.   I have encouraged them to ask questions as they arise throughout their visit. Vital Signs-Reviewed the patient's vital signs. Patient Vitals for the past 12 hrs:   Temp Pulse Resp BP SpO2   07/12/21 1002 97.8 °F (36.6 °C) 83 16 (!) 152/74 97 %       Records Reviewed: Nursing Notes and Old Medical Records          ED Course:          Provider Notes (Medical Decision Making):   Presents with thumb pain. Differential diagnosis include fracture, sprain, strain, paronychia, tendinitis, arthritis. Patient's x-ray showed severe arthritis. Patient will be put in a Velcro thumb spica and follow-up with hand surgery. MDM       Procedures   Medical Decision Makingedical Decision Making  Performed by: Bandar Vora NP  PROCEDURES:  Procedures       Disposition   Disposition: DC- Adult Discharges: All of the diagnostic tests were reviewed and questions answered. Diagnosis, care plan and treatment options were discussed. The patient understands the instructions and will follow up as directed. The patients results have been reviewed with them. They have been counseled regarding their diagnosis. The patient verbally convey understanding and agreement of the signs, symptoms, diagnosis, treatment and prognosis and additionally agrees to follow up as recommended with their PCP in 24 - 48 hours. They also agree with the care-plan and convey that all of their questions have been answered. I have also put together some discharge instructions for them that include: 1) educational information regarding their diagnosis, 2) how to care for their diagnosis at home, as well a 3) list of reasons why they would want to return to the ED prior to their follow-up appointment, should their condition change. Discharged    DISCHARGE PLAN:  1.    Current Discharge Medication List      CONTINUE these medications which have NOT CHANGED    Details   ALPRAZolam (XANAX) 0.5 mg tablet TAKE 1 TABLET BY MOUTH THREE TIMES A DAY  Qty: 90 Tablet, Refills: 0    Comments: Not to exceed 4 additional fills before 11/20/2021  Associated Diagnoses: Chronic anxiety      dilTIAZem IR (CARDIZEM) 120 mg tablet TAKE 1/2 TABLET BY MOUTH TWICE DAILY  Qty: 90 Tablet, Refills: 1      buPROPion SR (WELLBUTRIN SR) 150 mg SR tablet TAKE 1 TABLET BY MOUTH TWICE DAILY  Qty: 180 Tablet, Refills: 1      pravastatin (PRAVACHOL) 40 mg tablet TAKE 1 TABLET BY MOUTH EVERY NIGHT AT BEDTIME  Qty: 90 Tab, Refills: 1      levothyroxine (SYNTHROID) 50 mcg tablet TAKE 1 TABLET BY MOUTH EVERY DAY ON EMPTY STOMACH  Qty: 90 Tab, Refills: 1      BIOTIN PO Take 5,000 mcg by mouth. 1 capsule PO daily      cranberry fruit extract (CRANBERRY PO) Take  by mouth. 1 capsule PO daily      hydrOXYzine HCL (ATARAX) 50 mg tablet TK 1 T PO QID PRN      aspirin delayed-release (Adult Low Dose Aspirin) 81 mg tablet Take  by mouth daily. OTHER Cannabidiol (CBD) oral edible Take 2 edibles every day by oral route      cyclobenzaprine (FLEXERIL) 10 mg tablet Take  by mouth three (3) times daily as needed for Muscle Spasm(s). Take 1 tablet by mouth twice a day      conjugated estrogens (Premarin) 1.25 mg tablet Take 1.25 mg by mouth daily. ascorbic acid, vitamin C, (Vitamin C) 250 mg tablet Take  by mouth. 2.   Follow-up Information     Follow up With Specialties Details Why Contact Info    Yolette Mcclelland NP Nurse Practitioner   [facility] [address]       [place]   [address] [place] [address]  [phone]          3. Return to ED if worse   4. Current Discharge Medication List      START taking these medications    Details   naproxen (Naprosyn) 500 mg tablet Take 1 Tablet by mouth two (2) times daily (with meals) for 10 days.   Qty: 20 Tablet, Refills: 0  Start date: 7/12/2021, End date: 7/22/2021      HYDROcodone-acetaminophen (Lorcet, HYDROcodone,) 5-325 mg per tablet Take 1 Tablet by mouth every six (6) hours as needed for Pain for up to 3 days. Max Daily Amount: 4 Tablets. Qty: 12 Tablet, Refills: 0  Start date: 7/12/2021, End date: 7/15/2021    Associated Diagnoses: Arthritis of left hand               Diagnosis     Clinical Impression:   1. Arthritis of left hand        Attestations:    Josh Gonzalez NP    Please note that this dictation was completed with [employer], the computer voice recognition software. Quite often unanticipated grammatical, syntax, homophones, and other interpretive errors are inadvertently transcribed by the computer software. Please disregard these errors. Please excuse any errors that have escaped final proofreading. Thank you.

## 2021-07-12 NOTE — ED TRIAGE NOTES
[facility] pt stated that she had a dog bite to her left pointer finger about 2 weeks ago; pt stated that she has been having increased pain, swelling and warmth to that digit

## 2021-07-19 ENCOUNTER — TELEPHONE (OUTPATIENT)
Dept: OBGYN CLINIC | Age: 66
End: 2021-07-19

## 2021-07-19 DIAGNOSIS — N95.1 SYMPTOMATIC MENOPAUSAL OR FEMALE CLIMACTERIC STATES: Primary | ICD-10-CM

## 2021-07-19 NOTE — TELEPHONE ENCOUNTER
Patient has an appointment scheduled for 10/26/2021. She needs a refill on her premarin. Pharmacy was updated as well.

## 2021-07-20 RX ORDER — ESTRADIOL 1 MG/1
TABLET ORAL
Qty: 45 TABLET | Refills: 0 | Status: SHIPPED | OUTPATIENT
Start: 2021-07-20 | End: 2021-08-19 | Stop reason: SDUPTHER

## 2021-08-02 DIAGNOSIS — F41.9 CHRONIC ANXIETY: ICD-10-CM

## 2021-08-02 RX ORDER — ALPRAZOLAM 0.5 MG/1
TABLET ORAL
Qty: 90 TABLET | Refills: 0 | Status: SHIPPED | OUTPATIENT
Start: 2021-08-02

## 2021-08-19 DIAGNOSIS — N95.1 SYMPTOMATIC MENOPAUSAL OR FEMALE CLIMACTERIC STATES: Primary | ICD-10-CM

## 2021-08-19 RX ORDER — ESTRADIOL 1 MG/1
TABLET ORAL
Qty: 135 TABLET | Refills: 0 | Status: SHIPPED | OUTPATIENT
Start: 2021-08-19 | End: 2021-11-15

## 2021-11-12 DIAGNOSIS — N95.1 SYMPTOMATIC MENOPAUSAL OR FEMALE CLIMACTERIC STATES: ICD-10-CM

## 2021-11-15 RX ORDER — ESTRADIOL 1 MG/1
TABLET ORAL
Qty: 135 TABLET | Refills: 0 | Status: SHIPPED | OUTPATIENT
Start: 2021-11-15 | End: 2022-02-15

## 2021-12-20 RX ORDER — BUPROPION HYDROCHLORIDE 150 MG/1
TABLET, EXTENDED RELEASE ORAL
Qty: 180 TABLET | Refills: 1 | Status: SHIPPED | OUTPATIENT
Start: 2021-12-20

## 2022-02-15 DIAGNOSIS — N95.1 SYMPTOMATIC MENOPAUSAL OR FEMALE CLIMACTERIC STATES: ICD-10-CM

## 2022-02-15 RX ORDER — ESTRADIOL 1 MG/1
TABLET ORAL
Qty: 135 TABLET | Refills: 0 | Status: SHIPPED | OUTPATIENT
Start: 2022-02-15 | End: 2022-04-14 | Stop reason: SDUPTHER

## 2022-03-18 PROBLEM — G47.00 INSOMNIA: Status: ACTIVE | Noted: 2020-07-15

## 2022-03-19 PROBLEM — E78.00 HYPERCHOLESTEROLEMIA: Status: ACTIVE | Noted: 2020-07-15

## 2022-03-19 PROBLEM — J30.9 ALLERGIC RHINITIS: Status: ACTIVE | Noted: 2020-07-15

## 2022-03-19 PROBLEM — F41.9 CHRONIC ANXIETY: Status: ACTIVE | Noted: 2020-07-15

## 2022-03-19 PROBLEM — E03.9 ACQUIRED HYPOTHYROIDISM: Status: ACTIVE | Noted: 2020-07-15

## 2022-03-20 PROBLEM — K21.9 GASTROESOPHAGEAL REFLUX DISEASE: Status: ACTIVE | Noted: 2020-07-15

## 2022-04-14 ENCOUNTER — OFFICE VISIT (OUTPATIENT)
Dept: OBGYN CLINIC | Age: 67
End: 2022-04-14
Payer: MEDICARE

## 2022-04-14 VITALS
BODY MASS INDEX: 20.5 KG/M2 | DIASTOLIC BLOOD PRESSURE: 62 MMHG | HEIGHT: 62 IN | SYSTOLIC BLOOD PRESSURE: 110 MMHG | WEIGHT: 111.38 LBS

## 2022-04-14 DIAGNOSIS — N95.1 SYMPTOMATIC MENOPAUSAL OR FEMALE CLIMACTERIC STATES: ICD-10-CM

## 2022-04-14 DIAGNOSIS — N95.1 MENOPAUSAL SYNDROME: ICD-10-CM

## 2022-04-14 DIAGNOSIS — Z12.72 ENCOUNTER FOR SCREENING FOR MALIGNANT NEOPLASM OF VAGINA: Primary | ICD-10-CM

## 2022-04-14 PROCEDURE — G8420 CALC BMI NORM PARAMETERS: HCPCS | Performed by: OBSTETRICS & GYNECOLOGY

## 2022-04-14 PROCEDURE — G8510 SCR DEP NEG, NO PLAN REQD: HCPCS | Performed by: OBSTETRICS & GYNECOLOGY

## 2022-04-14 PROCEDURE — G8536 NO DOC ELDER MAL SCRN: HCPCS | Performed by: OBSTETRICS & GYNECOLOGY

## 2022-04-14 PROCEDURE — G0101 CA SCREEN;PELVIC/BREAST EXAM: HCPCS | Performed by: OBSTETRICS & GYNECOLOGY

## 2022-04-14 PROCEDURE — G8427 DOCREV CUR MEDS BY ELIG CLIN: HCPCS | Performed by: OBSTETRICS & GYNECOLOGY

## 2022-04-14 RX ORDER — ESTRADIOL 1 MG/1
TABLET ORAL
Qty: 135 TABLET | Refills: 3 | Status: SHIPPED | OUTPATIENT
Start: 2022-04-14 | End: 2022-04-14 | Stop reason: CLARIF

## 2022-04-14 RX ORDER — ESTRADIOL 2 MG/1
2 TABLET ORAL DAILY
Qty: 90 TABLET | Refills: 3 | Status: SHIPPED | OUTPATIENT
Start: 2022-04-14

## 2022-04-14 NOTE — PROGRESS NOTES
Chief Complaint   Patient presents with    Annual Exam     Visit Vitals  /62 (BP 1 Location: Left upper arm, BP Patient Position: Sitting, BP Cuff Size: Small adult)   Ht 5' 2\" (1.575 m)   Wt 111 lb 6 oz (50.5 kg)   BMI 20.37 kg/m²

## 2022-04-19 LAB
CYTOLOGIST CVX/VAG CYTO: NORMAL
CYTOLOGY CVX/VAG DOC CYTO: NORMAL
CYTOLOGY CVX/VAG DOC THIN PREP: NORMAL
DX ICD CODE: NORMAL
LABCORP, 190119: NORMAL
Lab: NORMAL
OTHER STN SPEC: NORMAL
STAT OF ADQ CVX/VAG CYTO-IMP: NORMAL

## 2022-05-18 DIAGNOSIS — N95.1 SYMPTOMATIC MENOPAUSAL OR FEMALE CLIMACTERIC STATES: ICD-10-CM

## 2022-05-18 RX ORDER — ESTRADIOL 1 MG/1
TABLET ORAL
Qty: 135 TABLET | Refills: 0 | Status: SHIPPED | OUTPATIENT
Start: 2022-05-18

## 2022-06-08 ENCOUNTER — APPOINTMENT (OUTPATIENT)
Dept: CT IMAGING | Age: 67
End: 2022-06-08
Attending: EMERGENCY MEDICINE
Payer: MEDICARE

## 2022-06-08 ENCOUNTER — HOSPITAL ENCOUNTER (EMERGENCY)
Age: 67
Discharge: HOME | End: 2022-06-08
Attending: EMERGENCY MEDICINE
Payer: MEDICARE

## 2022-06-08 VITALS
TEMPERATURE: 98 F | RESPIRATION RATE: 16 BRPM | OXYGEN SATURATION: 99 % | HEART RATE: 92 BPM | DIASTOLIC BLOOD PRESSURE: 87 MMHG | HEIGHT: 62 IN | SYSTOLIC BLOOD PRESSURE: 167 MMHG | BODY MASS INDEX: 20.06 KG/M2 | WEIGHT: 109 LBS

## 2022-06-08 DIAGNOSIS — S09.90XA CLOSED HEAD INJURY, INITIAL ENCOUNTER: Primary | ICD-10-CM

## 2022-06-08 PROCEDURE — 70450 CT HEAD/BRAIN W/O DYE: CPT

## 2022-06-08 PROCEDURE — 99284 EMERGENCY DEPT VISIT MOD MDM: CPT

## 2022-06-08 PROCEDURE — 74011250637 HC RX REV CODE- 250/637: Performed by: PHYSICIAN ASSISTANT

## 2022-06-08 RX ORDER — BUTALBITAL, ACETAMINOPHEN AND CAFFEINE 50; 325; 40 MG/1; MG/1; MG/1
1 TABLET ORAL
Status: COMPLETED | OUTPATIENT
Start: 2022-06-08 | End: 2022-06-08

## 2022-06-08 RX ORDER — BUTALBITAL, ACETAMINOPHEN AND CAFFEINE 50; 325; 40 MG/1; MG/1; MG/1
1 TABLET ORAL
Qty: 12 TABLET | Refills: 0 | Status: SHIPPED | OUTPATIENT
Start: 2022-06-08

## 2022-06-08 RX ADMIN — BUTALBITAL, ACETAMINOPHEN, AND CAFFEINE 1 TABLET: 50; 325; 40 TABLET ORAL at 11:48

## 2022-06-08 NOTE — ED PROVIDER NOTES
EMERGENCY DEPARTMENT HISTORY AND PHYSICAL EXAM      Date: 6/8/2022  Patient Name: Chalo Jarvis    History of Presenting Illness     Chief Complaint   Patient presents with    Headache    Head Injury       History Provided By: Patient    HPI: Chalo Jarvis, 78 y.o. female with a past medical history significant for HTN, anxiety, and depression who presents to the ED with cc of fall. Patient reports that she owns a dog kennel and was letting her dogs out of the cage this morning when they knocked her into a metal gate. Patient reports hitting her forehead but denies any loss of consciousness or fall. She presents with complaints of a \"dull\" frontal headache. Denies any nausea, vomiting, neck pain, changes in vision, lightheadedness, or dizziness. Denies any additional injury. Of note, patient takes 81 mg aspirin daily but states that she has not taken in 3 to 4 days. Patient denies treating pain with anything since onset. She states that she otherwise was reporting concerns. There are no other complaints, changes, or physical findings at this time. PCP: Mat Salas MD    No current facility-administered medications on file prior to encounter. Current Outpatient Medications on File Prior to Encounter   Medication Sig Dispense Refill    estradioL (ESTRACE) 1 mg tablet TAKE 1 AND 1/2 TABLETS BY MOUTH DAILY 135 Tablet 0    estradioL (ESTRACE) 2 mg tablet Take 1 Tablet by mouth daily.  90 Tablet 3    buPROPion SR (WELLBUTRIN SR) 150 mg SR tablet TAKE 1 TABLET BY MOUTH TWICE A  Tablet 1    ALPRAZolam (XANAX) 0.5 mg tablet TAKE 1 TABLET BY MOUTH THREE TIMES A DAY 90 Tablet 0    dilTIAZem IR (CARDIZEM) 120 mg tablet TAKE 1/2 TABLET BY MOUTH TWICE DAILY 90 Tablet 1    pravastatin (PRAVACHOL) 40 mg tablet TAKE 1 TABLET BY MOUTH EVERY NIGHT AT BEDTIME 90 Tab 1    levothyroxine (SYNTHROID) 50 mcg tablet TAKE 1 TABLET BY MOUTH EVERY DAY ON EMPTY STOMACH 90 Tab 1    BIOTIN PO Take 5,000 mcg by mouth. 1 capsule PO daily      cranberry fruit extract (CRANBERRY PO) Take  by mouth. 1 capsule PO daily      aspirin delayed-release (Adult Low Dose Aspirin) 81 mg tablet Take  by mouth daily.  OTHER Cannabidiol (CBD) oral edible Take 2 edibles every day by oral route       ascorbic acid, vitamin C, (Vitamin C) 250 mg tablet Take  by mouth. Past History     Past Medical History:  Past Medical History:   Diagnosis Date    Acquired hypothyroidism 7/15/2020    Allergic rhinitis 7/15/2020    Chronic anxiety 7/15/2020    Condyloma     Depression     Female stress incontinence     Hypercholesterolemia 7/15/2020    Hypertension     Insomnia 7/15/2020    Menopause     Relaxation of pelvis     Relaxation of pelvic floor.  Small bowel obstruction [facility])        Past Surgical History:  Past Surgical History:   Procedure Laterality Date    HX COLONOSCOPY  2016    HX GI      Repair of small intestine.  HX GYN      Laser Condyloma    HX HERNIA REPAIR      HX HYSTERECTOMY      HX PELVIC LAPAROSCOPY      JOAQUIN    HX RECTOCELE REPAIR      Repair of enterocele    HX SALPINGO-OOPHORECTOMY Right     HX TONSILLECTOMY      CT SLING OPER STRES INCONTINENCE         Family History:  Family History   Problem Relation Age of Onset    Heart Disease Mother     Other Mother         History of TIA    Ulcerative Colitis Mother     Heart Disease Father     Ulcerative Colitis Sister     Diabetes Brother     Thyroid Disease Other         Family hx of thyroid disorder, unspecified relation.  Hypertension Other         Family hx of HTN, unspecified relation. Social History:  Social History     Tobacco Use    Smoking status: Former Smoker     Quit date:      Years since quittin.4    Smokeless tobacco: Never Used   Vaping Use    Vaping Use: Never used   Substance Use Topics    Alcohol use: Yes     Comment: Occasional use.  Drug use: Not Currently       Allergies:   Allergies Allergen Reactions    Augmentin [Amoxicillin-Pot Clavulanate] Diarrhea    Biaxin [Clarithromycin] Other (comments)     Stomach burns    Ceftin [Cefuroxime Axetil] Other (comments)     Stomach burns    Codeine Itching    Eryc [Erythromycin] Rash    Erythromycin Base Nausea and Vomiting    Lomotil [Diphenoxylate-Atropine] Rash    Prednisone Other (comments)     confusion    Trazodone Itching    Sertraline Hives         Review of Systems     Review of Systems   Constitutional: Negative. Negative for chills, diaphoresis and fever. HENT: Negative. Negative for congestion, rhinorrhea and sore throat. Eyes: Negative. Respiratory: Negative. Negative for cough, chest tightness, shortness of breath and wheezing. Cardiovascular: Negative. Negative for chest pain and palpitations. Gastrointestinal: Negative. Negative for abdominal pain, diarrhea, nausea and vomiting. Genitourinary: Negative. Negative for difficulty urinating, dysuria, flank pain, frequency and hematuria. Musculoskeletal: Negative. Negative for back pain and gait problem. Skin: Negative. Negative for rash. Neurological: Positive for headaches. Negative for dizziness, syncope, weakness, light-headedness and numbness. Psychiatric/Behavioral: Negative. All other systems reviewed and are negative. Physical Exam     Physical Exam  Vitals and nursing note reviewed. Constitutional:       General: She is not in acute distress. Appearance: Normal appearance. She is not ill-appearing or toxic-appearing. HENT:      Head: Normocephalic. Abrasion (left forehead) present. Mouth/Throat:      Mouth: Mucous membranes are moist.      Pharynx: Oropharynx is clear. Eyes:      Extraocular Movements: Extraocular movements intact. Conjunctiva/sclera: Conjunctivae normal.      Pupils: Pupils are equal, round, and reactive to light. Cardiovascular:      Rate and Rhythm: Normal rate and regular rhythm.       Pulses: Normal pulses. Heart sounds: No murmur heard. No friction rub. No gallop. Pulmonary:      Effort: Pulmonary effort is normal.      Breath sounds: Normal breath sounds. No wheezing, rhonchi or rales. Abdominal:      General: There is no distension. Palpations: Abdomen is soft. Tenderness: There is no abdominal tenderness. There is no guarding or rebound. Musculoskeletal:      Cervical back: Neck supple. No tenderness. Skin:     General: Skin is warm and dry. Capillary Refill: Capillary refill takes less than 2 seconds. Findings: No rash. Neurological:      General: No focal deficit present. Mental Status: She is alert and oriented to person, place, and time. GCS: GCS eye subscore is 4. GCS verbal subscore is 5. GCS motor subscore is 6. Sensory: Sensation is intact. Motor: Motor function is intact. Gait: Gait is intact. Psychiatric:         Mood and Affect: Mood normal.         Behavior: Behavior normal.         Lab and Diagnostic Study Results     Labs -   No results found for this or any previous visit (from the past 12 hour(s)). Radiologic Studies -   @lastxrresult@  CT Results  (Last 48 hours)               06/08/22 1053  CT HEAD WO CONT Final result    Impression:  Periventricular white matter gliosis; evidence for nonacute end   vessel occlusive change. Central cerebral arteriosclerosis. No intracranial hemorrhage. Narrative:  CT head. Axial images are reviewed along with reformatted sagittal/coronal images. Dose reduction: All CT scans at this facility are performed using dose reduction   optimization techniques as appropriate to a performed exam including the   following-   automated exposure control, adjustments of mA and/or Kv according to patient   size, or use of iterative reconstructive technique. Bone windows demonstrate normal aeration imaged sinuses and mastoid air cells.        Review of intracranial content reveals mild diffuse low density through   periventricular/subcortical white matter; periventricular white matter gliosis   likely on the basis of nonacute end vessel occlusive change. Left coronal   radiata lacune. Central cerebral arteriosclerosis. No hydrocephalus. No   intracranial hemorrhage. CXR Results  (Last 48 hours)    None            Medical Decision Making   - I am the first provider for this patient. - I reviewed the vital signs, available nursing notes, past medical history, past surgical history, family history and social history. - Initial assessment performed. The patients presenting problems have been discussed, and they are in agreement with the care plan formulated and outlined with them. I have encouraged them to ask questions as they arise throughout their visit. Vital Signs-Reviewed the patient's vital signs. Patient Vitals for the past 12 hrs:   Temp Pulse Resp BP SpO2   06/08/22 1149 -- 92 -- (!) 167/87 --   06/08/22 1007 98 °F (36.7 °C) 77 16 (!) 187/89 99 %       Records Reviewed: Nursing Notes and Old Medical Records       Provider Notes (Medical Decision Making):     MDM  Number of Diagnoses or Management Options  Closed head injury, initial encounter  Diagnosis management comments: 77 y/o female presents with head trauma after a mechanical GLF. DDx includes MSK trauma, facial fractures, ICH or traumatic SAH, C-spine injury. Doubt other extracranial causes of injury. Considered nonmechanical causes of fall such as syncope, primary cardiopulmonary etiologies such as ACS/PE, but think these are unlikely.  Will get head/face/neck CT, pain control, reassess, anticipate discharge       Amount and/or Complexity of Data Reviewed  Tests in the radiology section of CPT®: ordered and reviewed  Tests in the medicine section of CPT®: ordered and reviewed  Review and summarize past medical records: yes       ED Course:   11:49 AM  Patient reassessed and updated on all results and findings. Her imaging is unremarkable. She states her pain is well controlled at this time and has no additional complaints or concerns. Patient is been educated on strict return precautions conveys understanding agree with care plan as outlined. All questions answered she is ready discharged home. Procedures   Medical Decision Makingedical Decision Making  Performed by: Aylin Barrett PA-C  PROCEDURES:  Procedures       Disposition   Disposition: DC- Adult Discharges: All of the diagnostic tests were reviewed and questions answered. Diagnosis, care plan and treatment options were discussed. The patient understands the instructions and will follow up as directed. The patients results have been reviewed with them. They have been counseled regarding their diagnosis. The patient verbally convey understanding and agreement of the signs, symptoms, diagnosis, treatment and prognosis and additionally agrees to follow up as recommended with their PCP in 24 - 48 hours. They also agree with the care-plan and convey that all of their questions have been answered. I have also put together some discharge instructions for them that include: 1) educational information regarding their diagnosis, 2) how to care for their diagnosis at home, as well a 3) list of reasons why they would want to return to the ED prior to their follow-up appointment, should their condition change. DISCHARGE PLAN:  1. Current Discharge Medication List      CONTINUE these medications which have NOT CHANGED    Details   !! estradioL (ESTRACE) 1 mg tablet TAKE 1 AND 1/2 TABLETS BY MOUTH DAILY  Qty: 135 Tablet, Refills: 0    Associated Diagnoses: Symptomatic menopausal or female climacteric states      !! estradioL (ESTRACE) 2 mg tablet Take 1 Tablet by mouth daily.   Qty: 90 Tablet, Refills: 3      buPROPion SR (WELLBUTRIN SR) 150 mg SR tablet TAKE 1 TABLET BY MOUTH TWICE A DAY  Qty: 180 Tablet, Refills: 1      ALPRAZolam [employer]) 0.5 mg tablet TAKE 1 TABLET BY MOUTH THREE TIMES A DAY  Qty: 90 Tablet, Refills: 0    Comments: Not to exceed 5 additional fills before 12/28/2021  Associated Diagnoses: Chronic anxiety      dilTIAZem IR (CARDIZEM) 120 mg tablet TAKE 1/2 TABLET BY MOUTH TWICE DAILY  Qty: 90 Tablet, Refills: 1      pravastatin (PRAVACHOL) 40 mg tablet TAKE 1 TABLET BY MOUTH EVERY NIGHT AT BEDTIME  Qty: 90 Tab, Refills: 1      levothyroxine (SYNTHROID) 50 mcg tablet TAKE 1 TABLET BY MOUTH EVERY DAY ON EMPTY STOMACH  Qty: 90 Tab, Refills: 1      BIOTIN PO Take 5,000 mcg by mouth. 1 capsule PO daily      cranberry fruit extract (CRANBERRY PO) Take  by mouth. 1 capsule PO daily      aspirin delayed-release (Adult Low Dose Aspirin) 81 mg tablet Take  by mouth daily. OTHER Cannabidiol (CBD) oral edible Take 2 edibles every day by oral route       ascorbic acid, vitamin C, (Vitamin C) 250 mg tablet Take  by mouth. !! - Potential duplicate medications found. Please discuss with provider. 2.   Follow-up Information     Follow up With Specialties Details Why Nida Lnua MD Family Medicine Schedule an appointment as soon as possible for a visit  As needed [place] [id number]  [address] [facility] [address] [phone]          3. Return to ED if worse   4. Discharge Medication List as of 6/8/2022 11:49 AM      START taking these medications    Details   butalbital-acetaminophen-caffeine (FIORICET, ESGIC) -40 mg per tablet Take 1 Tablet by mouth every six (6) hours as needed for Headache., Normal, Disp-12 Tablet, R-0         CONTINUE these medications which have NOT CHANGED    Details   !! estradioL (ESTRACE) 1 mg tablet TAKE 1 AND 1/2 TABLETS BY MOUTH DAILY, Normal, Disp-135 Tablet, R-0      !! estradioL (ESTRACE) 2 mg tablet Take 1 Tablet by mouth daily. , Normal, Disp-90 Tablet, R-3      buPROPion SR (WELLBUTRIN SR) 150 mg SR tablet TAKE 1 TABLET BY MOUTH TWICE A DAY, Normal, Disp-180 Tablet, R-1      ALPRAZolam (XANAX) 0.5 mg tablet TAKE 1 TABLET BY MOUTH THREE TIMES A DAY, Normal, Disp-90 Tablet, R-0Not to exceed 5 additional fills before 12/28/2021      dilTIAZem IR (CARDIZEM) 120 mg tablet TAKE 1/2 TABLET BY MOUTH TWICE DAILY, Normal, Disp-90 Tablet, R-1      pravastatin (PRAVACHOL) 40 mg tablet TAKE 1 TABLET BY MOUTH EVERY NIGHT AT BEDTIME, Normal, Disp-90 Tab, R-1      levothyroxine (SYNTHROID) 50 mcg tablet TAKE 1 TABLET BY MOUTH EVERY DAY ON EMPTY STOMACH, Normal, Disp-90 Tab, R-1      BIOTIN PO Take 5,000 mcg by mouth. 1 capsule PO daily, Historical Med      cranberry fruit extract (CRANBERRY PO) Take  by mouth. 1 capsule PO daily, Historical Med      aspirin delayed-release (Adult Low Dose Aspirin) 81 mg tablet Take  by mouth daily. , Historical Med      OTHER Cannabidiol (CBD) oral edible Take 2 edibles every day by oral route , Historical Med      ascorbic acid, vitamin C, (Vitamin C) 250 mg tablet Take  by mouth., Historical Med       !! - Potential duplicate medications found. Please discuss with provider. Diagnosis     Clinical Impression:   1. Closed head injury, initial encounter        Attestations:    Tonya Padgett PA-C    Please note that this dictation was completed with [employer], the [employer] voice recognition software. Quite often unanticipated grammatical, syntax, homophones, and other interpretive errors are inadvertently transcribed by the computer software. Please disregard these errors. Please excuse any errors that have escaped final proofreading. Thank you.

## 2022-06-08 NOTE — Clinical Note
[facility] EMERGENCY DEPT  [facility] [place] [id number]  [phone]    Work/School Note    Date: 6/8/2022    To Whom It May concern:      Katrin Alicea was seen and treated today in the emergency room by the following provider(s):  Attending Provider: Rosaura Javier DO  Physician Assistant: Mark Gomez PA-C. Katrin Alicea is excused from work/school on 06/08/22. She is clear to return to work/school on 06/09/22.         Sincerely,          Milton Padgett PA-C

## 2022-10-06 ENCOUNTER — TRANSCRIBE ORDER (OUTPATIENT)
Dept: SCHEDULING | Age: 67
End: 2022-10-06

## 2022-10-06 ENCOUNTER — HOSPITAL ENCOUNTER (OUTPATIENT)
Dept: GENERAL RADIOLOGY | Age: 67
Discharge: HOME | End: 2022-10-06
Payer: MEDICARE

## 2022-10-06 ENCOUNTER — TRANSCRIBE ORDER (OUTPATIENT)
Dept: REGISTRATION | Age: 67
End: 2022-10-06

## 2022-10-06 DIAGNOSIS — Z13.820 ENCOUNTER FOR IMAGING TO ASSESS OSTEOPOROSIS: Primary | ICD-10-CM

## 2022-10-06 DIAGNOSIS — Z78.0 ASYMPTOMATIC MENOPAUSAL STATE: ICD-10-CM

## 2022-10-06 DIAGNOSIS — M25.511 PAIN IN RIGHT SHOULDER: Primary | ICD-10-CM

## 2022-10-06 DIAGNOSIS — M25.511 PAIN IN RIGHT SHOULDER: ICD-10-CM

## 2022-10-06 PROCEDURE — 73030 X-RAY EXAM OF SHOULDER: CPT

## 2022-11-02 ENCOUNTER — HOSPITAL ENCOUNTER (OUTPATIENT)
Dept: MAMMOGRAPHY | Age: 67
Discharge: HOME | End: 2022-11-02
Attending: FAMILY MEDICINE
Payer: MEDICARE

## 2022-11-02 DIAGNOSIS — Z13.820 ENCOUNTER FOR IMAGING TO ASSESS OSTEOPOROSIS: ICD-10-CM

## 2022-11-02 DIAGNOSIS — Z78.0 ASYMPTOMATIC MENOPAUSAL STATE: ICD-10-CM

## 2022-11-02 PROCEDURE — 77080 DXA BONE DENSITY AXIAL: CPT

## 2023-04-17 ENCOUNTER — TELEPHONE (OUTPATIENT)
Dept: OBGYN CLINIC | Age: 68
End: 2023-04-17

## 2023-04-17 DIAGNOSIS — N95.1 SYMPTOMATIC MENOPAUSAL OR FEMALE CLIMACTERIC STATES: Primary | ICD-10-CM

## 2023-04-17 RX ORDER — ESTRADIOL 2 MG/1
2 TABLET ORAL DAILY
Qty: 90 TABLET | Refills: 0 | Status: SHIPPED | OUTPATIENT
Start: 2023-04-17

## 2023-04-17 NOTE — TELEPHONE ENCOUNTER
04/17/23 Spw patient as she was transferred to schedule her appt with Dr. Toño Hernández and mammo appt----patient has medicare and was informed Jennifer Chapin will only cover annual visits every 2 years not every year-------patient has been scheduled for a medication refill for this off year for 04/27/23 11:30AM to follow her yearly mammo appt at 11:00AM.

## 2023-04-17 NOTE — TELEPHONE ENCOUNTER
Spoke with the patient and a refill has been submitted to her pharmacy and she has been scheduled for her annual exam and mammogram.

## 2023-05-18 RX ORDER — DILTIAZEM HYDROCHLORIDE 120 MG/1
0.5 TABLET, FILM COATED ORAL 2 TIMES DAILY
COMMUNITY
Start: 2021-06-18

## 2023-05-18 RX ORDER — ALPRAZOLAM 0.5 MG/1
1 TABLET ORAL 3 TIMES DAILY
COMMUNITY
Start: 2021-08-02

## 2023-05-18 RX ORDER — ASCORBIC ACID 250 MG
TABLET ORAL
COMMUNITY

## 2023-05-18 RX ORDER — ESTRADIOL 2 MG/1
2 TABLET ORAL DAILY
COMMUNITY
Start: 2023-04-17 | End: 2023-07-07 | Stop reason: SDUPTHER

## 2023-05-18 RX ORDER — BUPROPION HYDROCHLORIDE 150 MG/1
1 TABLET, EXTENDED RELEASE ORAL 2 TIMES DAILY
COMMUNITY
Start: 2021-12-20

## 2023-05-18 RX ORDER — ASPIRIN 81 MG/1
TABLET ORAL DAILY
COMMUNITY

## 2023-05-18 RX ORDER — LEVOTHYROXINE SODIUM 0.05 MG/1
TABLET ORAL
COMMUNITY
Start: 2021-03-01

## 2023-05-18 RX ORDER — BUTALBITAL, ACETAMINOPHEN AND CAFFEINE 50; 325; 40 MG/1; MG/1; MG/1
1 TABLET ORAL EVERY 6 HOURS PRN
COMMUNITY
Start: 2022-06-08 | End: 2023-07-07

## 2023-05-18 RX ORDER — PRAVASTATIN SODIUM 40 MG
1 TABLET ORAL NIGHTLY
COMMUNITY
Start: 2021-03-01

## 2023-05-19 VITALS — BODY MASS INDEX: 19.94 KG/M2 | HEIGHT: 62 IN

## 2023-05-26 ENCOUNTER — TELEPHONE (OUTPATIENT)
Age: 68
End: 2023-05-26

## 2023-07-07 ENCOUNTER — OFFICE VISIT (OUTPATIENT)
Age: 68
End: 2023-07-07
Payer: MEDICARE

## 2023-07-07 VITALS
SYSTOLIC BLOOD PRESSURE: 138 MMHG | HEIGHT: 62 IN | WEIGHT: 107 LBS | BODY MASS INDEX: 19.69 KG/M2 | DIASTOLIC BLOOD PRESSURE: 74 MMHG

## 2023-07-07 DIAGNOSIS — N95.1 MENOPAUSAL SYNDROME: Primary | ICD-10-CM

## 2023-07-07 PROCEDURE — G8427 DOCREV CUR MEDS BY ELIG CLIN: HCPCS | Performed by: OBSTETRICS & GYNECOLOGY

## 2023-07-07 PROCEDURE — G8420 CALC BMI NORM PARAMETERS: HCPCS | Performed by: OBSTETRICS & GYNECOLOGY

## 2023-07-07 PROCEDURE — 1123F ACP DISCUSS/DSCN MKR DOCD: CPT | Performed by: OBSTETRICS & GYNECOLOGY

## 2023-07-07 PROCEDURE — 1090F PRES/ABSN URINE INCON ASSESS: CPT | Performed by: OBSTETRICS & GYNECOLOGY

## 2023-07-07 PROCEDURE — G8399 PT W/DXA RESULTS DOCUMENT: HCPCS | Performed by: OBSTETRICS & GYNECOLOGY

## 2023-07-07 PROCEDURE — 3017F COLORECTAL CA SCREEN DOC REV: CPT | Performed by: OBSTETRICS & GYNECOLOGY

## 2023-07-07 PROCEDURE — 99213 OFFICE O/P EST LOW 20 MIN: CPT | Performed by: OBSTETRICS & GYNECOLOGY

## 2023-07-07 PROCEDURE — 1036F TOBACCO NON-USER: CPT | Performed by: OBSTETRICS & GYNECOLOGY

## 2023-07-07 RX ORDER — ESTRADIOL 2 MG/1
2 TABLET ORAL DAILY
Qty: 90 TABLET | Refills: 3 | Status: SHIPPED | OUTPATIENT
Start: 2023-07-07

## 2023-07-07 ASSESSMENT — ENCOUNTER SYMPTOMS
RESPIRATORY NEGATIVE: 1
GASTROINTESTINAL NEGATIVE: 1

## 2023-07-07 NOTE — PROGRESS NOTES
Radha Choi is a , 80 y.o. female   No LMP recorded. Patient has had a hysterectomy. She presents for her problem    She is having no significant problems. Doing well with Estrace PO      Menstrual status:  Cycles are hysterectomy. Flow: absent. She does not have dysmenorrhea. Medical conditions:  Since her last annual GYN exam about one year ago, she has not the following changes in her health history: none. Mammogram History:    MAKSIM Results (most recent):  @mPort(PFN9826:1)@     DEXA Results (most recent):  @mPort(NCZ8129:1)@       Past Medical History:   Diagnosis Date    Acquired hypothyroidism 7/15/2020    Allergic rhinitis 7/15/2020    Chronic anxiety 7/15/2020    Condyloma     Depression     Female stress incontinence     Hypercholesterolemia 7/15/2020    Hypertension     Insomnia 7/15/2020    Menopause     Relaxation of pelvis     Relaxation of pelvic floor. Small bowel obstruction [facility])      Past Surgical History:   Procedure Laterality Date    COLONOSCOPY      GI      Repair of small intestine. GYN      Laser Condyloma    HERNIA REPAIR      HYSTERECTOMY (CERVIX STATUS UNKNOWN)      PELVIC LAPAROSCOPY      LYNDA    RECTOCELE REPAIR      Repair of enterocele    SALPINGO-OOPHORECTOMY Right     SLING OPER STRES INCONTINENCE      TONSILLECTOMY         Prior to Admission medications    Medication Sig Start Date End Date Taking? Authorizing Provider   estradiol (ESTRACE) 2 MG tablet Take 1 tablet by mouth daily 23  Yes Shell Torres MD   BIOTIN PO Take 5,000 mcg by mouth   Yes Ar Automatic Reconciliation   ALPRAZolam (XANAX) 0.5 MG tablet Take 1 tablet by mouth 3 times daily.  21  Yes Ar Automatic Reconciliation   ascorbic acid (VITAMIN C) 250 MG tablet Take by mouth   Yes Ar Automatic Reconciliation   aspirin 81 MG EC tablet Take by mouth daily   Yes Ar Automatic Reconciliation   buPROPion (WELLBUTRIN SR) 150 MG extended release tablet Take 1

## 2024-07-08 RX ORDER — ESTRADIOL 2 MG/1
2 TABLET ORAL DAILY
Qty: 90 TABLET | Refills: 0 | Status: SHIPPED | OUTPATIENT
Start: 2024-07-08

## 2024-08-21 ENCOUNTER — OFFICE VISIT (OUTPATIENT)
Age: 69
End: 2024-08-21
Payer: MEDICARE

## 2024-08-21 VITALS
WEIGHT: 111.25 LBS | HEIGHT: 62 IN | BODY MASS INDEX: 20.47 KG/M2 | SYSTOLIC BLOOD PRESSURE: 114 MMHG | DIASTOLIC BLOOD PRESSURE: 68 MMHG

## 2024-08-21 DIAGNOSIS — N95.1 MENOPAUSAL SYNDROME: ICD-10-CM

## 2024-08-21 DIAGNOSIS — Z12.72 SCREENING FOR VAGINAL CANCER: ICD-10-CM

## 2024-08-21 DIAGNOSIS — Z90.710 HISTORY OF HYSTERECTOMY: ICD-10-CM

## 2024-08-21 DIAGNOSIS — Z12.31 SCREENING MAMMOGRAM FOR BREAST CANCER: ICD-10-CM

## 2024-08-21 DIAGNOSIS — Z01.419 GYNECOLOGIC EXAM NORMAL: ICD-10-CM

## 2024-08-21 DIAGNOSIS — N95.9 MENOPAUSAL PROBLEM: Primary | ICD-10-CM

## 2024-08-21 PROCEDURE — G0101 CA SCREEN;PELVIC/BREAST EXAM: HCPCS | Performed by: OBSTETRICS & GYNECOLOGY

## 2024-08-21 PROCEDURE — G8427 DOCREV CUR MEDS BY ELIG CLIN: HCPCS | Performed by: OBSTETRICS & GYNECOLOGY

## 2024-08-21 PROCEDURE — G8420 CALC BMI NORM PARAMETERS: HCPCS | Performed by: OBSTETRICS & GYNECOLOGY

## 2024-08-21 RX ORDER — ESTRADIOL 2 MG/1
2 TABLET ORAL DAILY
Qty: 90 TABLET | Refills: 3 | Status: SHIPPED | OUTPATIENT
Start: 2024-08-21

## 2024-08-21 ASSESSMENT — ENCOUNTER SYMPTOMS
RESPIRATORY NEGATIVE: 1
GASTROINTESTINAL NEGATIVE: 1

## 2024-08-21 NOTE — PROGRESS NOTES
Celestina Monte is a , 69 y.o. female   No LMP recorded. Patient has had a hysterectomy.    She presents for her annual    She is having no significant problems.      Menstrual status:  Cycles are hysterectomy.    Flow: absent.      She does not have dysmenorrhea.      Medical conditions:  Since her last annual GYN exam about one year ago, she has not the following changes in her health history: none.     Mammogram History:    MAKSIM Results (most recent):  @BSA Curated WorldILASTIMGCAT(MPN0405:1)@     DEXA Results (most recent):  @BSHSILASTIMGCAT(YIZ5496:1)@       Past Medical History:   Diagnosis Date    Acquired hypothyroidism 7/15/2020    Allergic rhinitis 7/15/2020    Chronic anxiety 7/15/2020    Condyloma     Depression     Female stress incontinence     Hypercholesterolemia 7/15/2020    Hypertension     Insomnia 7/15/2020    Menopause     Relaxation of pelvis     Relaxation of pelvic floor.     Small bowel obstruction (HCC)      Past Surgical History:   Procedure Laterality Date    COLONOSCOPY      GI      Repair of small intestine.    GYN      Laser Condyloma    HERNIA REPAIR      HYSTERECTOMY, VAGINAL      PELVIC LAPAROSCOPY      LYNDA    RECTOCELE REPAIR      Repair of enterocele    SALPINGO-OOPHORECTOMY Right     SLING OPER STRES INCONTINENCE      TONSILLECTOMY         Prior to Admission medications    Medication Sig Start Date End Date Taking? Authorizing Provider   estradiol (ESTRACE) 2 MG tablet Take 1 tablet by mouth daily 24  Yes Nii Catherine MD   BIOTIN PO Take 5,000 mcg by mouth   Yes Automatic Reconciliation, Ar   ALPRAZolam (XANAX) 0.5 MG tablet Take 1 tablet by mouth 3 times daily. 21  Yes Automatic Reconciliation, Ar   ascorbic acid (VITAMIN C) 250 MG tablet Take by mouth   Yes Automatic Reconciliation, Ar   aspirin 81 MG EC tablet Take by mouth daily   Yes Automatic Reconciliation, Ar   buPROPion (WELLBUTRIN SR) 150 MG extended release tablet Take 1 tablet by mouth 2 times daily

## 2024-08-21 NOTE — PROGRESS NOTES
Chief Complaint   Patient presents with    Annual Exam     [id number]  [id number]     /68 (Site: Left Upper Arm, Position: Sitting, Cuff Size: Small Adult)   Ht 1.575 m (5' 2\")   Wt 50.5 kg (111 lb 4 oz)   BMI 20.35 kg/m²

## 2024-08-24 LAB
., LABCORP: NORMAL
CYTOLOGIST CVX/VAG CYTO: NORMAL
CYTOLOGY CVX/VAG DOC CYTO: NORMAL
CYTOLOGY CVX/VAG DOC THIN PREP: NORMAL
DX ICD CODE: NORMAL
Lab: NORMAL
Lab: NORMAL
OTHER STN SPEC: NORMAL
STAT OF ADQ CVX/VAG CYTO-IMP: NORMAL

## 2024-09-30 NOTE — TELEPHONE ENCOUNTER
Last seen [id number] and no inconsistencies noted in  Writer called and spoke with patient. Appointment scheduled.    Leigh FIERRO RN   [facility], Urology   9/30/2024 11:06 AM

## 2024-11-05 ENCOUNTER — HOSPITAL ENCOUNTER (OUTPATIENT)
Facility: HOSPITAL | Age: 69
Discharge: HOME | End: 2024-11-08
Attending: OBSTETRICS & GYNECOLOGY
Payer: MEDICARE

## 2024-11-05 DIAGNOSIS — Z12.31 SCREENING MAMMOGRAM FOR BREAST CANCER: ICD-10-CM

## 2024-11-05 DIAGNOSIS — N95.9 MENOPAUSAL PROBLEM: ICD-10-CM

## 2024-11-05 PROCEDURE — 77080 DXA BONE DENSITY AXIAL: CPT

## 2024-11-05 PROCEDURE — 77063 BREAST TOMOSYNTHESIS BI: CPT

## 2025-07-17 DIAGNOSIS — N95.1 MENOPAUSAL SYNDROME: ICD-10-CM

## 2025-07-17 RX ORDER — ESTRADIOL 2 MG/1
2 TABLET ORAL DAILY
Qty: 90 TABLET | Refills: 0 | Status: SHIPPED | OUTPATIENT
Start: 2025-07-17